# Patient Record
Sex: FEMALE | Race: WHITE | NOT HISPANIC OR LATINO | Employment: FULL TIME | URBAN - METROPOLITAN AREA
[De-identification: names, ages, dates, MRNs, and addresses within clinical notes are randomized per-mention and may not be internally consistent; named-entity substitution may affect disease eponyms.]

---

## 2023-02-28 LAB
APPEARANCE, URINE: ABNORMAL
BACTERIA, URINE: ABNORMAL /HPF
BILIRUBIN, URINE: NEGATIVE
BLOOD, URINE: NEGATIVE
CALCIUM OXALATE CRYSTALS, URINE: ABNORMAL /HPF
COLOR, URINE: ABNORMAL
GLUCOSE, URINE: NEGATIVE MG/DL
KETONES, URINE: ABNORMAL MG/DL
LEUKOCYTE ESTERASE, URINE: ABNORMAL
MUCUS, URINE: ABNORMAL /LPF
NITRITE, URINE: POSITIVE
PH, URINE: 5 (ref 5–8)
PROTEIN, URINE: NEGATIVE MG/DL
RBC, URINE: 4 /HPF (ref 0–5)
SPECIFIC GRAVITY, URINE: 1.03 (ref 1–1.03)
SQUAMOUS EPITHELIAL CELLS, URINE: 8 /HPF
UROBILINOGEN, URINE: <2 MG/DL (ref 0–1.9)
WBC CLUMPS, URINE: ABNORMAL /HPF
WBC, URINE: 8 /HPF (ref 0–5)

## 2023-03-02 LAB — URINE CULTURE: ABNORMAL

## 2023-03-16 LAB
ACTIVATED PARTIAL THROMBOPLASTIN TIME IN PPP BY COAGULATION ASSAY: 30 SEC (ref 26–39)
ALANINE AMINOTRANSFERASE (SGPT) (U/L) IN SER/PLAS: 34 U/L (ref 7–45)
ALBUMIN (G/DL) IN SER/PLAS: 4.2 G/DL (ref 3.4–5)
ALKALINE PHOSPHATASE (U/L) IN SER/PLAS: 93 U/L (ref 33–110)
ANION GAP IN SER/PLAS: 13 MMOL/L (ref 10–20)
ASPARTATE AMINOTRANSFERASE (SGOT) (U/L) IN SER/PLAS: 24 U/L (ref 9–39)
BASOPHILS (10*3/UL) IN BLOOD BY AUTOMATED COUNT: 0.03 X10E9/L (ref 0–0.1)
BASOPHILS/100 LEUKOCYTES IN BLOOD BY AUTOMATED COUNT: 0.4 % (ref 0–2)
BILIRUBIN TOTAL (MG/DL) IN SER/PLAS: 0.2 MG/DL (ref 0–1.2)
CALCIUM (MG/DL) IN SER/PLAS: 8.8 MG/DL (ref 8.6–10.3)
CARBON DIOXIDE, TOTAL (MMOL/L) IN SER/PLAS: 36 MMOL/L (ref 21–32)
CHLORIDE (MMOL/L) IN SER/PLAS: 101 MMOL/L (ref 98–107)
CREATININE (MG/DL) IN SER/PLAS: 0.53 MG/DL (ref 0.5–1.05)
EOSINOPHILS (10*3/UL) IN BLOOD BY AUTOMATED COUNT: 0.14 X10E9/L (ref 0–0.7)
EOSINOPHILS/100 LEUKOCYTES IN BLOOD BY AUTOMATED COUNT: 2.1 % (ref 0–6)
ERYTHROCYTE DISTRIBUTION WIDTH (RATIO) BY AUTOMATED COUNT: 13.5 % (ref 11.5–14.5)
ERYTHROCYTE MEAN CORPUSCULAR HEMOGLOBIN CONCENTRATION (G/DL) BY AUTOMATED: 32.2 G/DL (ref 32–36)
ERYTHROCYTE MEAN CORPUSCULAR VOLUME (FL) BY AUTOMATED COUNT: 89 FL (ref 80–100)
ERYTHROCYTES (10*6/UL) IN BLOOD BY AUTOMATED COUNT: 4.76 X10E12/L (ref 4–5.2)
GFR FEMALE: >90 ML/MIN/1.73M2
GLUCOSE (MG/DL) IN SER/PLAS: 93 MG/DL (ref 74–99)
HEMATOCRIT (%) IN BLOOD BY AUTOMATED COUNT: 42.5 % (ref 36–46)
HEMOGLOBIN (G/DL) IN BLOOD: 13.7 G/DL (ref 12–16)
IMMATURE GRANULOCYTES/100 LEUKOCYTES IN BLOOD BY AUTOMATED COUNT: 0.3 % (ref 0–0.9)
INR IN PPP BY COAGULATION ASSAY: 0.9 (ref 0.9–1.1)
LEUKOCYTES (10*3/UL) IN BLOOD BY AUTOMATED COUNT: 6.8 X10E9/L (ref 4.4–11.3)
LYMPHOCYTES (10*3/UL) IN BLOOD BY AUTOMATED COUNT: 1.34 X10E9/L (ref 1.2–4.8)
LYMPHOCYTES/100 LEUKOCYTES IN BLOOD BY AUTOMATED COUNT: 19.7 % (ref 13–44)
MONOCYTES (10*3/UL) IN BLOOD BY AUTOMATED COUNT: 0.56 X10E9/L (ref 0.1–1)
MONOCYTES/100 LEUKOCYTES IN BLOOD BY AUTOMATED COUNT: 8.2 % (ref 2–10)
NEUTROPHILS (10*3/UL) IN BLOOD BY AUTOMATED COUNT: 4.71 X10E9/L (ref 1.2–7.7)
NEUTROPHILS/100 LEUKOCYTES IN BLOOD BY AUTOMATED COUNT: 69.3 % (ref 40–80)
PLATELETS (10*3/UL) IN BLOOD AUTOMATED COUNT: 461 X10E9/L (ref 150–450)
POTASSIUM (MMOL/L) IN SER/PLAS: 3.6 MMOL/L (ref 3.5–5.3)
PROTEIN TOTAL: 6.4 G/DL (ref 6.4–8.2)
PROTHROMBIN TIME (PT) IN PPP BY COAGULATION ASSAY: 10.3 SEC (ref 9.8–13.4)
SODIUM (MMOL/L) IN SER/PLAS: 146 MMOL/L (ref 136–145)
UREA NITROGEN (MG/DL) IN SER/PLAS: 7 MG/DL (ref 6–23)

## 2023-03-19 LAB — STAPH/MRSA SCREEN, CULTURE: ABNORMAL

## 2023-03-21 DIAGNOSIS — N30.00 ACUTE CYSTITIS WITHOUT HEMATURIA: Primary | ICD-10-CM

## 2023-03-21 RX ORDER — NITROFURANTOIN 25; 75 MG/1; MG/1
100 CAPSULE ORAL 2 TIMES DAILY
Qty: 14 CAPSULE | Refills: 0 | Status: SHIPPED | OUTPATIENT
Start: 2023-03-21 | End: 2023-03-28

## 2023-04-02 LAB — SARS-COV-2 RESULT: NOT DETECTED

## 2023-04-10 ENCOUNTER — PATIENT OUTREACH (OUTPATIENT)
Dept: CARE COORDINATION | Facility: CLINIC | Age: 53
End: 2023-04-10
Payer: COMMERCIAL

## 2023-04-26 ENCOUNTER — PATIENT OUTREACH (OUTPATIENT)
Dept: CARE COORDINATION | Facility: CLINIC | Age: 53
End: 2023-04-26
Payer: COMMERCIAL

## 2023-05-11 LAB
BASOPHILS (10*3/UL) IN BLOOD BY AUTOMATED COUNT: 0.13 X10E9/L (ref 0–0.1)
BASOPHILS/100 LEUKOCYTES IN BLOOD BY AUTOMATED COUNT: 1.2 % (ref 0–2)
C REACTIVE PROTEIN (MG/L) IN SER/PLAS: 11.44 MG/DL
CBC DIFFERENTIAL PATH REVIEW: NORMAL
CREATININE (MG/DL) IN SER/PLAS: 0.38 MG/DL (ref 0.5–1.05)
EOSINOPHILS (10*3/UL) IN BLOOD BY AUTOMATED COUNT: 0.28 X10E9/L (ref 0–0.7)
EOSINOPHILS/100 LEUKOCYTES IN BLOOD BY AUTOMATED COUNT: 2.6 % (ref 0–6)
ERYTHROCYTE DISTRIBUTION WIDTH (RATIO) BY AUTOMATED COUNT: 18 % (ref 11.5–14.5)
ERYTHROCYTE MEAN CORPUSCULAR HEMOGLOBIN CONCENTRATION (G/DL) BY AUTOMATED: 30.4 G/DL (ref 32–36)
ERYTHROCYTE MEAN CORPUSCULAR VOLUME (FL) BY AUTOMATED COUNT: 90 FL (ref 80–100)
ERYTHROCYTES (10*6/UL) IN BLOOD BY AUTOMATED COUNT: 3.16 X10E12/L (ref 4–5.2)
GFR FEMALE: >90 ML/MIN/1.73M2
HEMATOCRIT (%) IN BLOOD BY AUTOMATED COUNT: 28.3 % (ref 36–46)
HEMOGLOBIN (G/DL) IN BLOOD: 8.6 G/DL (ref 12–16)
IMMATURE GRANULOCYTES/100 LEUKOCYTES IN BLOOD BY AUTOMATED COUNT: 4.7 % (ref 0–0.9)
LEUKOCYTES (10*3/UL) IN BLOOD BY AUTOMATED COUNT: 10.8 X10E9/L (ref 4.4–11.3)
LYMPHOCYTES (10*3/UL) IN BLOOD BY AUTOMATED COUNT: 2.32 X10E9/L (ref 1.2–4.8)
LYMPHOCYTES/100 LEUKOCYTES IN BLOOD BY AUTOMATED COUNT: 21.4 % (ref 13–44)
MONOCYTES (10*3/UL) IN BLOOD BY AUTOMATED COUNT: 1.1 X10E9/L (ref 0.1–1)
MONOCYTES/100 LEUKOCYTES IN BLOOD BY AUTOMATED COUNT: 10.2 % (ref 2–10)
NEUTROPHILS (10*3/UL) IN BLOOD BY AUTOMATED COUNT: 6.49 X10E9/L (ref 1.2–7.7)
NEUTROPHILS/100 LEUKOCYTES IN BLOOD BY AUTOMATED COUNT: 59.9 % (ref 40–80)
PLATELETS (10*3/UL) IN BLOOD AUTOMATED COUNT: 1240 X10E9/L (ref 150–450)
RBC MORPHOLOGY IN BLOOD: NORMAL
ROULEAUX PRESENCE IN BLOOD BY LIGHT MICROSCOPY: PRESENT
SEDIMENTATION RATE, ERYTHROCYTE: 55 MM/H (ref 0–30)

## 2023-05-18 LAB
BASOPHILS (10*3/UL) IN BLOOD BY AUTOMATED COUNT: 0.06 X10E9/L (ref 0–0.1)
BASOPHILS/100 LEUKOCYTES IN BLOOD BY AUTOMATED COUNT: 0.6 % (ref 0–2)
C REACTIVE PROTEIN (MG/L) IN SER/PLAS: 6.25 MG/DL
CREATININE (MG/DL) IN SER/PLAS: 0.42 MG/DL (ref 0.5–1.05)
EOSINOPHILS (10*3/UL) IN BLOOD BY AUTOMATED COUNT: 0.86 X10E9/L (ref 0–0.7)
EOSINOPHILS/100 LEUKOCYTES IN BLOOD BY AUTOMATED COUNT: 8 % (ref 0–6)
ERYTHROCYTE DISTRIBUTION WIDTH (RATIO) BY AUTOMATED COUNT: 18.3 % (ref 11.5–14.5)
ERYTHROCYTE MEAN CORPUSCULAR HEMOGLOBIN CONCENTRATION (G/DL) BY AUTOMATED: 30.3 G/DL (ref 32–36)
ERYTHROCYTE MEAN CORPUSCULAR VOLUME (FL) BY AUTOMATED COUNT: 90 FL (ref 80–100)
ERYTHROCYTES (10*6/UL) IN BLOOD BY AUTOMATED COUNT: 3.66 X10E12/L (ref 4–5.2)
GFR FEMALE: >90 ML/MIN/1.73M2
HEMATOCRIT (%) IN BLOOD BY AUTOMATED COUNT: 33 % (ref 36–46)
HEMOGLOBIN (G/DL) IN BLOOD: 10 G/DL (ref 12–16)
IMMATURE GRANULOCYTES/100 LEUKOCYTES IN BLOOD BY AUTOMATED COUNT: 0.8 % (ref 0–0.9)
LEUKOCYTES (10*3/UL) IN BLOOD BY AUTOMATED COUNT: 10.8 X10E9/L (ref 4.4–11.3)
LYMPHOCYTES (10*3/UL) IN BLOOD BY AUTOMATED COUNT: 1.61 X10E9/L (ref 1.2–4.8)
LYMPHOCYTES/100 LEUKOCYTES IN BLOOD BY AUTOMATED COUNT: 14.9 % (ref 13–44)
MONOCYTES (10*3/UL) IN BLOOD BY AUTOMATED COUNT: 0.85 X10E9/L (ref 0.1–1)
MONOCYTES/100 LEUKOCYTES IN BLOOD BY AUTOMATED COUNT: 7.9 % (ref 2–10)
NEUTROPHILS (10*3/UL) IN BLOOD BY AUTOMATED COUNT: 7.32 X10E9/L (ref 1.2–7.7)
NEUTROPHILS/100 LEUKOCYTES IN BLOOD BY AUTOMATED COUNT: 67.8 % (ref 40–80)
PLATELETS (10*3/UL) IN BLOOD AUTOMATED COUNT: 925 X10E9/L (ref 150–450)
SEDIMENTATION RATE, ERYTHROCYTE: >130 MM/H (ref 0–30)

## 2023-05-22 LAB
BASOPHILS (10*3/UL) IN BLOOD BY AUTOMATED COUNT: 0.05 X10E9/L (ref 0–0.1)
BASOPHILS/100 LEUKOCYTES IN BLOOD BY AUTOMATED COUNT: 0.6 % (ref 0–2)
C REACTIVE PROTEIN (MG/L) IN SER/PLAS: 8.89 MG/DL
CREATININE (MG/DL) IN SER/PLAS: 0.34 MG/DL (ref 0.5–1.05)
EOSINOPHILS (10*3/UL) IN BLOOD BY AUTOMATED COUNT: 0.6 X10E9/L (ref 0–0.7)
EOSINOPHILS/100 LEUKOCYTES IN BLOOD BY AUTOMATED COUNT: 7.2 % (ref 0–6)
ERYTHROCYTE DISTRIBUTION WIDTH (RATIO) BY AUTOMATED COUNT: 17.8 % (ref 11.5–14.5)
ERYTHROCYTE MEAN CORPUSCULAR HEMOGLOBIN CONCENTRATION (G/DL) BY AUTOMATED: 30.7 G/DL (ref 32–36)
ERYTHROCYTE MEAN CORPUSCULAR VOLUME (FL) BY AUTOMATED COUNT: 87 FL (ref 80–100)
ERYTHROCYTES (10*6/UL) IN BLOOD BY AUTOMATED COUNT: 3.39 X10E12/L (ref 4–5.2)
GFR FEMALE: >90 ML/MIN/1.73M2
HEMATOCRIT (%) IN BLOOD BY AUTOMATED COUNT: 29.6 % (ref 36–46)
HEMOGLOBIN (G/DL) IN BLOOD: 9.1 G/DL (ref 12–16)
IMMATURE GRANULOCYTES/100 LEUKOCYTES IN BLOOD BY AUTOMATED COUNT: 0.2 % (ref 0–0.9)
LEUKOCYTES (10*3/UL) IN BLOOD BY AUTOMATED COUNT: 8.3 X10E9/L (ref 4.4–11.3)
LYMPHOCYTES (10*3/UL) IN BLOOD BY AUTOMATED COUNT: 1.15 X10E9/L (ref 1.2–4.8)
LYMPHOCYTES/100 LEUKOCYTES IN BLOOD BY AUTOMATED COUNT: 13.9 % (ref 13–44)
MONOCYTES (10*3/UL) IN BLOOD BY AUTOMATED COUNT: 0.65 X10E9/L (ref 0.1–1)
MONOCYTES/100 LEUKOCYTES IN BLOOD BY AUTOMATED COUNT: 7.9 % (ref 2–10)
NEUTROPHILS (10*3/UL) IN BLOOD BY AUTOMATED COUNT: 5.81 X10E9/L (ref 1.2–7.7)
NEUTROPHILS/100 LEUKOCYTES IN BLOOD BY AUTOMATED COUNT: 70.2 % (ref 40–80)
PLATELETS (10*3/UL) IN BLOOD AUTOMATED COUNT: 578 X10E9/L (ref 150–450)
SEDIMENTATION RATE, ERYTHROCYTE: >130 MM/H (ref 0–30)

## 2023-05-25 LAB
BASOPHILS (10*3/UL) IN BLOOD BY AUTOMATED COUNT: 0.09 X10E9/L (ref 0–0.1)
BASOPHILS/100 LEUKOCYTES IN BLOOD BY AUTOMATED COUNT: 1.2 % (ref 0–2)
C REACTIVE PROTEIN (MG/L) IN SER/PLAS: 6.69 MG/DL
CREATININE (MG/DL) IN SER/PLAS: 0.4 MG/DL (ref 0.5–1.05)
EOSINOPHILS (10*3/UL) IN BLOOD BY AUTOMATED COUNT: 0.54 X10E9/L (ref 0–0.7)
EOSINOPHILS/100 LEUKOCYTES IN BLOOD BY AUTOMATED COUNT: 7.1 % (ref 0–6)
ERYTHROCYTE DISTRIBUTION WIDTH (RATIO) BY AUTOMATED COUNT: 17.8 % (ref 11.5–14.5)
ERYTHROCYTE MEAN CORPUSCULAR HEMOGLOBIN CONCENTRATION (G/DL) BY AUTOMATED: 30.4 G/DL (ref 32–36)
ERYTHROCYTE MEAN CORPUSCULAR VOLUME (FL) BY AUTOMATED COUNT: 88 FL (ref 80–100)
ERYTHROCYTES (10*6/UL) IN BLOOD BY AUTOMATED COUNT: 3.53 X10E12/L (ref 4–5.2)
GFR FEMALE: >90 ML/MIN/1.73M2
HEMATOCRIT (%) IN BLOOD BY AUTOMATED COUNT: 30.9 % (ref 36–46)
HEMOGLOBIN (G/DL) IN BLOOD: 9.4 G/DL (ref 12–16)
IMMATURE GRANULOCYTES/100 LEUKOCYTES IN BLOOD BY AUTOMATED COUNT: 0.5 % (ref 0–0.9)
LEUKOCYTES (10*3/UL) IN BLOOD BY AUTOMATED COUNT: 7.6 X10E9/L (ref 4.4–11.3)
LYMPHOCYTES (10*3/UL) IN BLOOD BY AUTOMATED COUNT: 1.17 X10E9/L (ref 1.2–4.8)
LYMPHOCYTES/100 LEUKOCYTES IN BLOOD BY AUTOMATED COUNT: 15.3 % (ref 13–44)
MONOCYTES (10*3/UL) IN BLOOD BY AUTOMATED COUNT: 0.7 X10E9/L (ref 0.1–1)
MONOCYTES/100 LEUKOCYTES IN BLOOD BY AUTOMATED COUNT: 9.2 % (ref 2–10)
NEUTROPHILS (10*3/UL) IN BLOOD BY AUTOMATED COUNT: 5.1 X10E9/L (ref 1.2–7.7)
NEUTROPHILS/100 LEUKOCYTES IN BLOOD BY AUTOMATED COUNT: 66.7 % (ref 40–80)
PLATELETS (10*3/UL) IN BLOOD AUTOMATED COUNT: 548 X10E9/L (ref 150–450)
SEDIMENTATION RATE, ERYTHROCYTE: 60 MM/H (ref 0–30)

## 2023-06-01 LAB
BASOPHILS (10*3/UL) IN BLOOD BY AUTOMATED COUNT: 0.06 X10E9/L (ref 0–0.1)
BASOPHILS/100 LEUKOCYTES IN BLOOD BY AUTOMATED COUNT: 1.1 % (ref 0–2)
C REACTIVE PROTEIN (MG/L) IN SER/PLAS: 8.23 MG/DL
CREATININE (MG/DL) IN SER/PLAS: 0.39 MG/DL (ref 0.5–1.05)
EOSINOPHILS (10*3/UL) IN BLOOD BY AUTOMATED COUNT: 0.94 X10E9/L (ref 0–0.7)
EOSINOPHILS/100 LEUKOCYTES IN BLOOD BY AUTOMATED COUNT: 16.6 % (ref 0–6)
ERYTHROCYTE DISTRIBUTION WIDTH (RATIO) BY AUTOMATED COUNT: 18.3 % (ref 11.5–14.5)
ERYTHROCYTE MEAN CORPUSCULAR HEMOGLOBIN CONCENTRATION (G/DL) BY AUTOMATED: 30.4 G/DL (ref 32–36)
ERYTHROCYTE MEAN CORPUSCULAR VOLUME (FL) BY AUTOMATED COUNT: 89 FL (ref 80–100)
ERYTHROCYTES (10*6/UL) IN BLOOD BY AUTOMATED COUNT: 3.52 X10E12/L (ref 4–5.2)
GFR FEMALE: >90 ML/MIN/1.73M2
HEMATOCRIT (%) IN BLOOD BY AUTOMATED COUNT: 31.3 % (ref 36–46)
HEMOGLOBIN (G/DL) IN BLOOD: 9.5 G/DL (ref 12–16)
IMMATURE GRANULOCYTES/100 LEUKOCYTES IN BLOOD BY AUTOMATED COUNT: 0.4 % (ref 0–0.9)
LEUKOCYTES (10*3/UL) IN BLOOD BY AUTOMATED COUNT: 5.7 X10E9/L (ref 4.4–11.3)
LYMPHOCYTES (10*3/UL) IN BLOOD BY AUTOMATED COUNT: 0.98 X10E9/L (ref 1.2–4.8)
LYMPHOCYTES/100 LEUKOCYTES IN BLOOD BY AUTOMATED COUNT: 17.3 % (ref 13–44)
MONOCYTES (10*3/UL) IN BLOOD BY AUTOMATED COUNT: 0.61 X10E9/L (ref 0.1–1)
MONOCYTES/100 LEUKOCYTES IN BLOOD BY AUTOMATED COUNT: 10.8 % (ref 2–10)
NEUTROPHILS (10*3/UL) IN BLOOD BY AUTOMATED COUNT: 3.06 X10E9/L (ref 1.2–7.7)
NEUTROPHILS/100 LEUKOCYTES IN BLOOD BY AUTOMATED COUNT: 53.8 % (ref 40–80)
PLATELETS (10*3/UL) IN BLOOD AUTOMATED COUNT: 528 X10E9/L (ref 150–450)
SEDIMENTATION RATE, ERYTHROCYTE: >130 MM/H (ref 0–30)

## 2023-06-05 LAB
BASOPHILS (10*3/UL) IN BLOOD BY AUTOMATED COUNT: 0.05 X10E9/L (ref 0–0.1)
BASOPHILS/100 LEUKOCYTES IN BLOOD BY AUTOMATED COUNT: 1 % (ref 0–2)
C REACTIVE PROTEIN (MG/L) IN SER/PLAS: 10.35 MG/DL
CREATININE (MG/DL) IN SER/PLAS: 0.42 MG/DL (ref 0.5–1.05)
EOSINOPHILS (10*3/UL) IN BLOOD BY AUTOMATED COUNT: 0.87 X10E9/L (ref 0–0.7)
EOSINOPHILS/100 LEUKOCYTES IN BLOOD BY AUTOMATED COUNT: 17.6 % (ref 0–6)
ERYTHROCYTE DISTRIBUTION WIDTH (RATIO) BY AUTOMATED COUNT: 18.1 % (ref 11.5–14.5)
ERYTHROCYTE MEAN CORPUSCULAR HEMOGLOBIN CONCENTRATION (G/DL) BY AUTOMATED: 30.7 G/DL (ref 32–36)
ERYTHROCYTE MEAN CORPUSCULAR VOLUME (FL) BY AUTOMATED COUNT: 87 FL (ref 80–100)
ERYTHROCYTES (10*6/UL) IN BLOOD BY AUTOMATED COUNT: 3.78 X10E12/L (ref 4–5.2)
GFR FEMALE: >90 ML/MIN/1.73M2
HEMATOCRIT (%) IN BLOOD BY AUTOMATED COUNT: 32.9 % (ref 36–46)
HEMOGLOBIN (G/DL) IN BLOOD: 10.1 G/DL (ref 12–16)
IMMATURE GRANULOCYTES/100 LEUKOCYTES IN BLOOD BY AUTOMATED COUNT: 0.4 % (ref 0–0.9)
LEUKOCYTES (10*3/UL) IN BLOOD BY AUTOMATED COUNT: 4.9 X10E9/L (ref 4.4–11.3)
LYMPHOCYTES (10*3/UL) IN BLOOD BY AUTOMATED COUNT: 0.76 X10E9/L (ref 1.2–4.8)
LYMPHOCYTES/100 LEUKOCYTES IN BLOOD BY AUTOMATED COUNT: 15.4 % (ref 13–44)
MONOCYTES (10*3/UL) IN BLOOD BY AUTOMATED COUNT: 0.56 X10E9/L (ref 0.1–1)
MONOCYTES/100 LEUKOCYTES IN BLOOD BY AUTOMATED COUNT: 11.4 % (ref 2–10)
NEUTROPHILS (10*3/UL) IN BLOOD BY AUTOMATED COUNT: 2.67 X10E9/L (ref 1.2–7.7)
NEUTROPHILS/100 LEUKOCYTES IN BLOOD BY AUTOMATED COUNT: 54.2 % (ref 40–80)
PLATELETS (10*3/UL) IN BLOOD AUTOMATED COUNT: 633 X10E9/L (ref 150–450)
SEDIMENTATION RATE, ERYTHROCYTE: 100 MM/H (ref 0–30)

## 2023-06-08 ENCOUNTER — TELEMEDICINE (OUTPATIENT)
Dept: PRIMARY CARE | Facility: CLINIC | Age: 53
End: 2023-06-08
Payer: COMMERCIAL

## 2023-06-08 DIAGNOSIS — G62.9 NEUROPATHY: ICD-10-CM

## 2023-06-08 DIAGNOSIS — S32.000G COMPRESSION FRACTURE OF LUMBAR VERTEBRA WITH DELAYED HEALING, UNSPECIFIED LUMBAR VERTEBRAL LEVEL, SUBSEQUENT ENCOUNTER: ICD-10-CM

## 2023-06-08 DIAGNOSIS — S22.080G CLOSED WEDGE COMPRESSION FRACTURE OF T12 VERTEBRA WITH DELAYED HEALING, SUBSEQUENT ENCOUNTER: Primary | ICD-10-CM

## 2023-06-08 PROBLEM — G43.909 MIGRAINES: Status: ACTIVE | Noted: 2023-06-08

## 2023-06-08 PROBLEM — E53.8 VITAMIN B12 DEFICIENCY: Status: ACTIVE | Noted: 2023-06-08

## 2023-06-08 PROBLEM — R11.2 NAUSEA WITH VOMITING: Status: ACTIVE | Noted: 2023-06-08

## 2023-06-08 PROBLEM — D50.0 IRON DEFICIENCY ANEMIA DUE TO CHRONIC BLOOD LOSS: Status: ACTIVE | Noted: 2023-06-08

## 2023-06-08 PROBLEM — S32.000A LUMBAR COMPRESSION FRACTURE (MULTI): Status: ACTIVE | Noted: 2023-06-08

## 2023-06-08 PROBLEM — F43.10 PTSD (POST-TRAUMATIC STRESS DISORDER): Status: ACTIVE | Noted: 2023-06-08

## 2023-06-08 PROBLEM — K58.9 IBS (IRRITABLE BOWEL SYNDROME): Status: ACTIVE | Noted: 2023-06-08

## 2023-06-08 PROBLEM — M54.9 BACK PAIN: Status: ACTIVE | Noted: 2023-06-08

## 2023-06-08 PROBLEM — E55.9 VITAMIN D DEFICIENCY: Status: ACTIVE | Noted: 2023-06-08

## 2023-06-08 PROCEDURE — 99442 PR PHYS/QHP TELEPHONE EVALUATION 11-20 MIN: CPT

## 2023-06-08 RX ORDER — DULOXETIN HYDROCHLORIDE 30 MG/1
1 CAPSULE, DELAYED RELEASE ORAL DAILY
COMMUNITY
Start: 2022-09-14 | End: 2023-10-27 | Stop reason: SDDI

## 2023-06-08 RX ORDER — DOCUSATE SODIUM 100 MG/1
100 CAPSULE, LIQUID FILLED ORAL 2 TIMES DAILY
COMMUNITY
Start: 2020-03-28 | End: 2024-05-13

## 2023-06-08 RX ORDER — CYCLOBENZAPRINE HCL 10 MG
1 TABLET ORAL 3 TIMES DAILY PRN
COMMUNITY
Start: 2022-09-14 | End: 2023-08-22 | Stop reason: SDUPTHER

## 2023-06-08 RX ORDER — OMEPRAZOLE 20 MG/1
1 CAPSULE, DELAYED RELEASE ORAL
COMMUNITY
Start: 2022-09-14

## 2023-06-08 RX ORDER — OXYCODONE HYDROCHLORIDE 5 MG/1
5 TABLET ORAL EVERY 6 HOURS PRN
COMMUNITY
Start: 2023-05-26

## 2023-06-08 RX ORDER — GABAPENTIN 300 MG/1
300 CAPSULE ORAL 3 TIMES DAILY
COMMUNITY
Start: 2022-11-29 | End: 2023-08-22 | Stop reason: SDUPTHER

## 2023-06-08 ASSESSMENT — ENCOUNTER SYMPTOMS
MUSCULOSKELETAL NEGATIVE: 1
CONSTITUTIONAL NEGATIVE: 1
RESPIRATORY NEGATIVE: 1
PSYCHIATRIC NEGATIVE: 1
CARDIOVASCULAR NEGATIVE: 1
ALLERGIC/IMMUNOLOGIC NEGATIVE: 1
GASTROINTESTINAL NEGATIVE: 1
HEMATOLOGIC/LYMPHATIC NEGATIVE: 1
NEUROLOGICAL NEGATIVE: 1
EYES NEGATIVE: 1
ENDOCRINE NEGATIVE: 1

## 2023-06-08 NOTE — PROGRESS NOTES
Subjective   Patient ID: Yarelis Lew is a 53 y.o. female who presents for Follow-up (/).    HPI a 53-year-old female with past medical history of chronic lower back pain, neuropathy, anxiety depression, acid reflux, IBS, wedge compression fracture T12 vertebrae with delayed healing, lumbar compression fracture is here for a telephone audio communication visit.  Over the last couple of months, she underwent extensive lumbar spinal fusion surgery that debridement and revision thoracolumbar fusion.  She was then transported to a local nursing facility for wound care treatment.  She was discharged 2 weeks ago back home where she has home health care for pain management, wound VAC management, and ADLs.  She recently had a follow-up appointment with her orthopedic surgeon on May 23, 2023 with positive results.  She continues to work hard every day to get back to normal.  She is requesting a jury duty dismissal paperwork due to her extensive medical complications.  She reports that she was expected to report for jury duty at the end of the month but due to obvious medical complications and recent surgery, she is unable to attend.  Other than this, she is doing well for the most part    Review of Systems   Constitutional: Negative.    HENT: Negative.     Eyes: Negative.    Respiratory: Negative.     Cardiovascular: Negative.    Gastrointestinal: Negative.    Endocrine: Negative.    Genitourinary: Negative.    Musculoskeletal: Negative.    Skin: Negative.    Allergic/Immunologic: Negative.    Neurological: Negative.    Hematological: Negative.    Psychiatric/Behavioral: Negative.     All other systems reviewed and are negative.      Objective   There were no vitals taken for this visit.    Physical Exam unable to complete due to the nature of this exam.    Assessment/Plan   Problem List Items Addressed This Visit          Nervous    Neuropathy    Relevant Orders    Follow Up In [facility] - PCP        Musculoskeletal    Lumbar compression fracture (CMS/HCC)    Relevant Orders    Follow Up In [facility] - PCP    Wedge compression fracture of T12 vertebra with delayed healing - Primary    Relevant Orders    Follow Up In [facility] - PCP     It was a pleasure speaking with you over the phone today.    Continue all of your medications as prescribed as they are working to control your symptoms.  Continue the treatment plan set forth by your orthopedic provider/surgeon, home health care, and physical therapy.  Work hard to get back on your feet but do not push yourself too much.  I will see you and speak with you again in 3 months.  Please do not hesitate to call office for any future questions or concerns.    I have written and mailed out your jury duty dismissal paperwork.    I also advised you to follow low fat diet and exercise for at least 30 minutes daily.    Anticipatory guidance, age appropriate vaccines, screening exams, health promotion and prevention discussed.    This document was generated using the assistance of voice recognition software. If there are any errors of spelling, grammar, syntax, or meaning; please feel free to contact me directly for clarification.

## 2023-06-08 NOTE — LETTER
Yarelis Lew  1970 6/8/2023    To Whom This May Concern:    My patient, Yarelis Lew, is unable to perform Jury Duty due to a physical condition that renders the prospective juror unfit for jury service for the remainder of the jury year.    Should you have any questions please do not hesitate to call.    Thank you for your cooperation.    Sincerely,    Indra Dyer, NATALIO-CNP

## 2023-06-12 LAB
BASOPHILS (10*3/UL) IN BLOOD BY AUTOMATED COUNT: 0.09 X10E9/L (ref 0–0.1)
BASOPHILS/100 LEUKOCYTES IN BLOOD BY AUTOMATED COUNT: 1.7 % (ref 0–2)
C REACTIVE PROTEIN (MG/L) IN SER/PLAS: 8.3 MG/DL
CREATININE (MG/DL) IN SER/PLAS: 0.38 MG/DL (ref 0.5–1.05)
EOSINOPHILS (10*3/UL) IN BLOOD BY AUTOMATED COUNT: 0.55 X10E9/L (ref 0–0.7)
EOSINOPHILS/100 LEUKOCYTES IN BLOOD BY AUTOMATED COUNT: 10.2 % (ref 0–6)
ERYTHROCYTE DISTRIBUTION WIDTH (RATIO) BY AUTOMATED COUNT: 18.6 % (ref 11.5–14.5)
ERYTHROCYTE MEAN CORPUSCULAR HEMOGLOBIN CONCENTRATION (G/DL) BY AUTOMATED: 30.4 G/DL (ref 32–36)
ERYTHROCYTE MEAN CORPUSCULAR VOLUME (FL) BY AUTOMATED COUNT: 87 FL (ref 80–100)
ERYTHROCYTES (10*6/UL) IN BLOOD BY AUTOMATED COUNT: 3.62 X10E12/L (ref 4–5.2)
GFR FEMALE: >90 ML/MIN/1.73M2
HEMATOCRIT (%) IN BLOOD BY AUTOMATED COUNT: 31.6 % (ref 36–46)
HEMOGLOBIN (G/DL) IN BLOOD: 9.6 G/DL (ref 12–16)
IMMATURE GRANULOCYTES/100 LEUKOCYTES IN BLOOD BY AUTOMATED COUNT: 0.2 % (ref 0–0.9)
LEUKOCYTES (10*3/UL) IN BLOOD BY AUTOMATED COUNT: 5.4 X10E9/L (ref 4.4–11.3)
LYMPHOCYTES (10*3/UL) IN BLOOD BY AUTOMATED COUNT: 1.5 X10E9/L (ref 1.2–4.8)
LYMPHOCYTES/100 LEUKOCYTES IN BLOOD BY AUTOMATED COUNT: 27.8 % (ref 13–44)
MONOCYTES (10*3/UL) IN BLOOD BY AUTOMATED COUNT: 0.79 X10E9/L (ref 0.1–1)
MONOCYTES/100 LEUKOCYTES IN BLOOD BY AUTOMATED COUNT: 14.7 % (ref 2–10)
NEUTROPHILS (10*3/UL) IN BLOOD BY AUTOMATED COUNT: 2.45 X10E9/L (ref 1.2–7.7)
NEUTROPHILS/100 LEUKOCYTES IN BLOOD BY AUTOMATED COUNT: 45.4 % (ref 40–80)
PLATELETS (10*3/UL) IN BLOOD AUTOMATED COUNT: 728 X10E9/L (ref 150–450)
SEDIMENTATION RATE, ERYTHROCYTE: 102 MM/H (ref 0–30)

## 2023-06-19 LAB
BASOPHILS (10*3/UL) IN BLOOD BY AUTOMATED COUNT: 0.1 X10E9/L (ref 0–0.1)
BASOPHILS/100 LEUKOCYTES IN BLOOD BY AUTOMATED COUNT: 1.6 % (ref 0–2)
C REACTIVE PROTEIN (MG/L) IN SER/PLAS: 4.41 MG/DL
CREATININE (MG/DL) IN SER/PLAS: 0.38 MG/DL (ref 0.5–1.05)
EOSINOPHILS (10*3/UL) IN BLOOD BY AUTOMATED COUNT: 0.2 X10E9/L (ref 0–0.7)
EOSINOPHILS/100 LEUKOCYTES IN BLOOD BY AUTOMATED COUNT: 3.2 % (ref 0–6)
ERYTHROCYTE DISTRIBUTION WIDTH (RATIO) BY AUTOMATED COUNT: 17.9 % (ref 11.5–14.5)
ERYTHROCYTE MEAN CORPUSCULAR HEMOGLOBIN CONCENTRATION (G/DL) BY AUTOMATED: 30.1 G/DL (ref 32–36)
ERYTHROCYTE MEAN CORPUSCULAR VOLUME (FL) BY AUTOMATED COUNT: 85 FL (ref 80–100)
ERYTHROCYTES (10*6/UL) IN BLOOD BY AUTOMATED COUNT: 4.18 X10E12/L (ref 4–5.2)
GFR FEMALE: >90 ML/MIN/1.73M2
HEMATOCRIT (%) IN BLOOD BY AUTOMATED COUNT: 35.6 % (ref 36–46)
HEMOGLOBIN (G/DL) IN BLOOD: 10.7 G/DL (ref 12–16)
IMMATURE GRANULOCYTES/100 LEUKOCYTES IN BLOOD BY AUTOMATED COUNT: 0.3 % (ref 0–0.9)
LEUKOCYTES (10*3/UL) IN BLOOD BY AUTOMATED COUNT: 6.3 X10E9/L (ref 4.4–11.3)
LYMPHOCYTES (10*3/UL) IN BLOOD BY AUTOMATED COUNT: 1.87 X10E9/L (ref 1.2–4.8)
LYMPHOCYTES/100 LEUKOCYTES IN BLOOD BY AUTOMATED COUNT: 29.7 % (ref 13–44)
MONOCYTES (10*3/UL) IN BLOOD BY AUTOMATED COUNT: 0.82 X10E9/L (ref 0.1–1)
MONOCYTES/100 LEUKOCYTES IN BLOOD BY AUTOMATED COUNT: 13 % (ref 2–10)
NEUTROPHILS (10*3/UL) IN BLOOD BY AUTOMATED COUNT: 3.29 X10E9/L (ref 1.2–7.7)
NEUTROPHILS/100 LEUKOCYTES IN BLOOD BY AUTOMATED COUNT: 52.2 % (ref 40–80)
PLATELETS (10*3/UL) IN BLOOD AUTOMATED COUNT: 736 X10E9/L (ref 150–450)
SEDIMENTATION RATE, ERYTHROCYTE: 124 MM/H (ref 0–30)
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
## 2023-07-21 ENCOUNTER — DOCUMENTATION (OUTPATIENT)
Dept: CARE COORDINATION | Facility: CLINIC | Age: 53
End: 2023-07-21
Payer: COMMERCIAL

## 2023-08-22 ENCOUNTER — TELEMEDICINE (OUTPATIENT)
Dept: PRIMARY CARE | Facility: CLINIC | Age: 53
End: 2023-08-22
Payer: COMMERCIAL

## 2023-08-22 DIAGNOSIS — G62.9 NEUROPATHY: ICD-10-CM

## 2023-08-22 DIAGNOSIS — S22.080G CLOSED WEDGE COMPRESSION FRACTURE OF T12 VERTEBRA WITH DELAYED HEALING, SUBSEQUENT ENCOUNTER: ICD-10-CM

## 2023-08-22 DIAGNOSIS — S32.000G COMPRESSION FRACTURE OF LUMBAR VERTEBRA WITH DELAYED HEALING, UNSPECIFIED LUMBAR VERTEBRAL LEVEL, SUBSEQUENT ENCOUNTER: ICD-10-CM

## 2023-08-22 PROCEDURE — 99442 PR PHYS/QHP TELEPHONE EVALUATION 11-20 MIN: CPT

## 2023-08-22 RX ORDER — GABAPENTIN 300 MG/1
300 CAPSULE ORAL 2 TIMES DAILY
Qty: 60 CAPSULE | Refills: 11 | Status: SHIPPED | OUTPATIENT
Start: 2023-08-22 | End: 2023-08-22

## 2023-08-22 RX ORDER — CYCLOBENZAPRINE HCL 10 MG
10 TABLET ORAL 3 TIMES DAILY PRN
Qty: 90 TABLET | Refills: 11 | Status: SHIPPED | OUTPATIENT
Start: 2023-08-22 | End: 2023-08-22

## 2023-08-22 ASSESSMENT — ENCOUNTER SYMPTOMS
BACK PAIN: 1
ALLERGIC/IMMUNOLOGIC NEGATIVE: 1
WOUND: 1
CONSTITUTIONAL NEGATIVE: 1
HEMATOLOGIC/LYMPHATIC NEGATIVE: 1
NUMBNESS: 1
ENDOCRINE NEGATIVE: 1
PSYCHIATRIC NEGATIVE: 1
RESPIRATORY NEGATIVE: 1
EYES NEGATIVE: 1
CARDIOVASCULAR NEGATIVE: 1
WEAKNESS: 1
GASTROINTESTINAL NEGATIVE: 1

## 2023-08-22 NOTE — PROGRESS NOTES
Subjective   Patient ID: Yarelis Lew is a 53 y.o. female who presents for No chief complaint on file..    HPI a 53-year-old female with past medical history of chronic lower back pain, neuropathy, anxiety depression, acid reflux, IBS, wedge compression fracture T12 vertebrae with delayed healing, lumbar compression fracture is here for a telephone audio communication visit.  Over the last year, she underwent extensive lumbar spinal fusion surgery that debridement and revision thoracolumbar fusion.  She was then transported to a local nursing facility for wound care treatment.  She was discharged 2 weeks ago back home where she has home health care for pain management, wound VAC management, and ADLs.  She recently had a follow-up appointment with her orthopedic surgeon on May 23, 2023 and July 17th, 2023 with positive results.  She continues to work hard every day to get back to normal.     Review of Systems   Constitutional: Negative.    HENT: Negative.     Eyes: Negative.    Respiratory: Negative.     Cardiovascular: Negative.    Gastrointestinal: Negative.    Endocrine: Negative.    Genitourinary: Negative.    Musculoskeletal:  Positive for back pain and gait problem.   Skin:  Positive for wound.   Allergic/Immunologic: Negative.    Neurological:  Positive for weakness and numbness.   Hematological: Negative.    Psychiatric/Behavioral: Negative.     All other systems reviewed and are negative.      Objective   There were no vitals taken for this visit.    Physical Exam Unable to complete due to the nature of this exam.     Assessment/Plan   Problem List Items Addressed This Visit       Lumbar compression fracture (CMS/HCC)    Relevant Medications    cyclobenzaprine (Flexeril) 10 mg tablet    gabapentin (Neurontin) 300 mg capsule    Wedge compression fracture of T12 vertebra with delayed healing    Relevant Medications    cyclobenzaprine (Flexeril) 10 mg tablet    gabapentin (Neurontin) 300 mg capsule     Neuropathy    Relevant Medications    cyclobenzaprine (Flexeril) 10 mg tablet    gabapentin (Neurontin) 300 mg capsule     It was a pleasure speaking with you in the office today.    Follow up with Ronaldo RICHMOND in 3 months.     Medications sent.     Anticipatory guidance, age appropriate vaccines, screening exams, health promotion and prevention discussed.    This document was generated using the assistance of voice recognition software. If there are any errors of spelling, grammar, syntax, or meaning; please feel free to contact me directly for clarification.

## 2023-09-08 ENCOUNTER — APPOINTMENT (OUTPATIENT)
Dept: PRIMARY CARE | Facility: CLINIC | Age: 53
End: 2023-09-08
Payer: COMMERCIAL

## 2023-09-27 ENCOUNTER — TRANSCRIBE ORDERS (OUTPATIENT)
Dept: ORTHOPEDIC SURGERY | Facility: CLINIC | Age: 53
End: 2023-09-27
Payer: COMMERCIAL

## 2023-09-27 DIAGNOSIS — M54.50 ACUTE BILATERAL LOW BACK PAIN: Primary | ICD-10-CM

## 2023-09-27 DIAGNOSIS — M54.9 POSTOPERATIVE BACK PAIN: ICD-10-CM

## 2023-09-27 DIAGNOSIS — G89.18 POSTOPERATIVE BACK PAIN: ICD-10-CM

## 2023-09-28 LAB
BASOPHILS (10*3/UL) IN BLOOD BY AUTOMATED COUNT: 0.05 X10E9/L (ref 0–0.1)
BASOPHILS/100 LEUKOCYTES IN BLOOD BY AUTOMATED COUNT: 0.7 % (ref 0–2)
C REACTIVE PROTEIN (MG/L) IN SER/PLAS: 0.88 MG/DL
CREATININE (MG/DL) IN SER/PLAS: 0.49 MG/DL (ref 0.5–1.05)
EOSINOPHILS (10*3/UL) IN BLOOD BY AUTOMATED COUNT: 0.21 X10E9/L (ref 0–0.7)
EOSINOPHILS/100 LEUKOCYTES IN BLOOD BY AUTOMATED COUNT: 3.1 % (ref 0–6)
ERYTHROCYTE DISTRIBUTION WIDTH (RATIO) BY AUTOMATED COUNT: 16.9 % (ref 11.5–14.5)
ERYTHROCYTE MEAN CORPUSCULAR HEMOGLOBIN CONCENTRATION (G/DL) BY AUTOMATED: 31 G/DL (ref 32–36)
ERYTHROCYTE MEAN CORPUSCULAR VOLUME (FL) BY AUTOMATED COUNT: 83 FL (ref 80–100)
ERYTHROCYTES (10*6/UL) IN BLOOD BY AUTOMATED COUNT: 4.57 X10E12/L (ref 4–5.2)
GFR FEMALE: >90 ML/MIN/1.73M2
HEMATOCRIT (%) IN BLOOD BY AUTOMATED COUNT: 38.1 % (ref 36–46)
HEMOGLOBIN (G/DL) IN BLOOD: 11.8 G/DL (ref 12–16)
IMMATURE GRANULOCYTES/100 LEUKOCYTES IN BLOOD BY AUTOMATED COUNT: 0.1 % (ref 0–0.9)
LEUKOCYTES (10*3/UL) IN BLOOD BY AUTOMATED COUNT: 6.8 X10E9/L (ref 4.4–11.3)
LYMPHOCYTES (10*3/UL) IN BLOOD BY AUTOMATED COUNT: 1.58 X10E9/L (ref 1.2–4.8)
LYMPHOCYTES/100 LEUKOCYTES IN BLOOD BY AUTOMATED COUNT: 23.4 % (ref 13–44)
MONOCYTES (10*3/UL) IN BLOOD BY AUTOMATED COUNT: 0.64 X10E9/L (ref 0.1–1)
MONOCYTES/100 LEUKOCYTES IN BLOOD BY AUTOMATED COUNT: 9.5 % (ref 2–10)
NEUTROPHILS (10*3/UL) IN BLOOD BY AUTOMATED COUNT: 4.27 X10E9/L (ref 1.2–7.7)
NEUTROPHILS/100 LEUKOCYTES IN BLOOD BY AUTOMATED COUNT: 63.2 % (ref 40–80)
PLATELETS (10*3/UL) IN BLOOD AUTOMATED COUNT: 412 X10E9/L (ref 150–450)

## 2023-10-01 PROBLEM — M62.81 MUSCLE WEAKNESS (GENERALIZED): Status: ACTIVE | Noted: 2023-05-08

## 2023-10-01 PROBLEM — F41.9 ANXIETY DISORDER, UNSPECIFIED: Status: ACTIVE | Noted: 2023-05-03

## 2023-10-01 PROBLEM — M46.20: Status: ACTIVE | Noted: 2023-06-20

## 2023-10-01 PROBLEM — R78.81 MSSA BACTEREMIA: Status: ACTIVE | Noted: 2023-06-20

## 2023-10-01 PROBLEM — M40.295: Status: ACTIVE | Noted: 2023-05-03

## 2023-10-01 PROBLEM — R48.8 OTHER SYMBOLIC DYSFUNCTIONS: Status: ACTIVE | Noted: 2023-05-08

## 2023-10-01 PROBLEM — B95.61 MSSA BACTEREMIA: Status: ACTIVE | Noted: 2023-06-20

## 2023-10-01 PROBLEM — B95.61 METHICILLIN SUSCEPTIBLE STAPHYLOCOCCUS AUREUS INFECTION AS THE CAUSE OF DISEASES CLASSIFIED ELSEWHERE: Status: ACTIVE | Noted: 2023-05-08

## 2023-10-01 PROBLEM — T84.498A: Status: ACTIVE | Noted: 2023-05-03

## 2023-10-01 PROBLEM — K21.9 GASTRO-ESOPHAGEAL REFLUX DISEASE WITHOUT ESOPHAGITIS: Status: ACTIVE | Noted: 2023-05-03

## 2023-10-01 PROBLEM — M43.25 FUSION OF SPINE, THORACOLUMBAR REGION: Status: ACTIVE | Noted: 2023-05-08

## 2023-10-01 PROBLEM — D64.9 ANEMIA, UNSPECIFIED: Status: ACTIVE | Noted: 2023-05-08

## 2023-10-01 RX ORDER — MIRTAZAPINE 45 MG/1
45 TABLET, FILM COATED ORAL NIGHTLY
COMMUNITY
End: 2023-10-27 | Stop reason: SDDI

## 2023-10-01 RX ORDER — DEXTROMETHORPHAN HYDROBROMIDE, GUAIFENESIN 5; 100 MG/5ML; MG/5ML
650 LIQUID ORAL 4 TIMES DAILY PRN
COMMUNITY

## 2023-10-01 RX ORDER — ALPRAZOLAM 0.5 MG/1
0.5 TABLET ORAL 2 TIMES DAILY PRN
COMMUNITY
End: 2023-10-27 | Stop reason: ALTCHOICE

## 2023-10-03 ENCOUNTER — PATIENT OUTREACH (OUTPATIENT)
Dept: CARE COORDINATION | Facility: CLINIC | Age: 53
End: 2023-10-03
Payer: COMMERCIAL

## 2023-10-03 ENCOUNTER — OFFICE VISIT (OUTPATIENT)
Dept: ORTHOPEDIC SURGERY | Facility: CLINIC | Age: 53
End: 2023-10-03
Payer: COMMERCIAL

## 2023-10-03 ENCOUNTER — ANCILLARY PROCEDURE (OUTPATIENT)
Dept: RADIOLOGY | Facility: CLINIC | Age: 53
End: 2023-10-03
Payer: COMMERCIAL

## 2023-10-03 DIAGNOSIS — S32.000D COMPRESSION FRACTURE OF LUMBAR VERTEBRA WITH ROUTINE HEALING, UNSPECIFIED LUMBAR VERTEBRAL LEVEL, SUBSEQUENT ENCOUNTER: ICD-10-CM

## 2023-10-03 DIAGNOSIS — Z98.1 STATUS POST LUMBAR SPINAL FUSION: Primary | ICD-10-CM

## 2023-10-03 DIAGNOSIS — M54.9 POSTOPERATIVE BACK PAIN: ICD-10-CM

## 2023-10-03 DIAGNOSIS — G89.18 POSTOPERATIVE BACK PAIN: ICD-10-CM

## 2023-10-03 PROCEDURE — 99213 OFFICE O/P EST LOW 20 MIN: CPT | Performed by: ORTHOPAEDIC SURGERY

## 2023-10-03 PROCEDURE — 72081 X-RAY EXAM ENTIRE SPI 1 VW: CPT

## 2023-10-03 PROCEDURE — 1036F TOBACCO NON-USER: CPT | Performed by: ORTHOPAEDIC SURGERY

## 2023-10-03 PROCEDURE — 72084 X-RAY EXAM ENTIRE SPI 6/> VW: CPT | Performed by: RADIOLOGY

## 2023-10-03 NOTE — LETTER
October 3, 2023     Indra Dyer, APRN-CNP  [address] [facility] [address] [facility] [address]  [facility] [address]    Patient: Yarelis Lew   YOB: 1970   Date of Visit: 10/3/2023       Dear Dr. Indra Dyer, APRN-CNP:    Thank you for referring Yarelis Lew to me for evaluation. Below are my notes for this consultation.  If you have questions, please do not hesitate to call me. I look forward to following your patient along with you.       Sincerely,     Kishan Schaefer MD      CC: No Recipients  ______________________________________________________________________________________    HPI:Yarelis Lew is a 53-year-old woman, who is now 6 months status post multilevel thoracolumbar osteotomy and thoracolumbar fusion.  Overall, she is improving.  At this time, she feels like she is ready to return to work.  She continues to have axial back pain.  She has no radicular pain.  She has done some physical therapy.      ROS:  Reviewed on EMR and patient intake sheet.    PMH/SH:  Reviewed on EMR and patient intake sheet.    Exam:  Physical Exam    Constitutional: Well appearing; no acute distress  Eyes: pupils are equal and round  Psych: normal affect  Respiratory: non-labored breathing  Cardiovascular: regular rate and rhythm  GI: non-distended abdomen  Musculoskeletal: no pain with range of motion of the shoulders bilaterally; no signs of impingement  Neurologic: [5]/5 strength in the lower extremities bilaterally]; [-] straight leg raise; no clonus; negative babinski    Clinically, the patient stands with beautiful sagittal and coronal alignment.    Radiology:     Standing x-rays demonstrate beautiful sagittal and coronal alignment.  Instrumentation in good position.    Diagnosis:    Status post thoracolumbar fusion    Assessment and Plan:   53-year-old woman 6 months status post thoracolumbar osteotomy and fusion.  Overall, she is doing well given the complexity of her surgical  intervention.  I have released her to return to work at this time with the exception of no heavy lifting greater than 10 pounds and avoiding excessive bending twisting and/or pushing and pulling.  I will see her back at the 1 year dahiana.    The patient was in agreement with the plan. At the end of the visit today, the patient felt that all questions had been answered satisfactorily.  The patient was pleased with the visit and very appreciative for the care rendered.     Thank you very much for the kind referral.  It is a privilege, and a pleasure, to partner with you in the care of your patients.  I would be delighted to assist you with any further consultations as needed.  Please feel free to have your patients refer to my website, [email or web address], for patient education materials regarding treatment options for common spinal conditions.        Kishan Schaefer MD    Chief of Spine Surgery, [facility]  Director of Spine Service, [facility]  , Department of Orthopaedics  [facility] [employer]  [id number] [place] [address] [place] [address]  [email or web address]  P: [phone]    This note was dictated with voice recognition software.  It has not been proofread for grammatical errors, typographical mistakes or other semantic inconsistencies.

## 2023-10-03 NOTE — PROGRESS NOTES
HPI:Yarelis Lew is a 53-year-old woman, who is now 6 months status post multilevel thoracolumbar osteotomy and thoracolumbar fusion.  Overall, she is improving.  At this time, she feels like she is ready to return to work.  She continues to have axial back pain.  She has no radicular pain.  She has done some physical therapy.      ROS:  Reviewed on EMR and patient intake sheet.    PMH/SH:  Reviewed on EMR and patient intake sheet.    Exam:  Physical Exam    Constitutional: Well appearing; no acute distress  Eyes: pupils are equal and round  Psych: normal affect  Respiratory: non-labored breathing  Cardiovascular: regular rate and rhythm  GI: non-distended abdomen  Musculoskeletal: no pain with range of motion of the shoulders bilaterally; no signs of impingement  Neurologic: [5]/5 strength in the lower extremities bilaterally]; [-] straight leg raise; no clonus; negative babinski    Clinically, the patient stands with beautiful sagittal and coronal alignment.    Radiology:     Standing x-rays demonstrate beautiful sagittal and coronal alignment.  Instrumentation in good position.    Diagnosis:    Status post thoracolumbar fusion    Assessment and Plan:   53-year-old woman 6 months status post thoracolumbar osteotomy and fusion.  Overall, she is doing well given the complexity of her surgical intervention.  I have released her to return to work at this time with the exception of no heavy lifting greater than 10 pounds and avoiding excessive bending twisting and/or pushing and pulling.  I will see her back at the 1 year dahiana.    The patient was in agreement with the plan. At the end of the visit today, the patient felt that all questions had been answered satisfactorily.  The patient was pleased with the visit and very appreciative for the care rendered.     Thank you very much for the kind referral.  It is a privilege, and a pleasure, to partner with you in the care of your patients.  I would be delighted to  assist you with any further consultations as needed.  Please feel free to have your patients refer to my website, [email or web address], for patient education materials regarding treatment options for common spinal conditions.        Kishan Schaefer MD    Chief of Spine Surgery, [facility]  Director of Spine Service, [facility]  , Department of Orthopaedics  [facility] [employer]  [address] [place] [address]  [email or web address]  P: [phone]    This note was dictated with voice recognition software.  It has not been proofread for grammatical errors, typographical mistakes or other semantic inconsistencies.

## 2023-10-03 NOTE — PROGRESS NOTES
Patient has been identified to meet criteria for eligibility for the PCCL program. Tried to contact patient to provide information regarding program. Unable to LVM. Sent email to provider: Dr. Kishan Schaefer to provide information to client.

## 2023-10-03 NOTE — LETTER
October 3, 2023     Patient: Yarelis Lew   YOB: 1970   Date of Visit: 10/3/2023       To Whom It May Concern:    It is my medical opinion that Yarelis Lew may return to work on 10/09/2023 with restrictions. No heavy lifting pushing or pulling .    If you have any questions or concerns, please don't hesitate to call.         Sincerely,        Kishan Schaefer MD    CC: No Recipients

## 2023-10-11 ENCOUNTER — PATIENT OUTREACH (OUTPATIENT)
Dept: CARE COORDINATION | Facility: CLINIC | Age: 53
End: 2023-10-11
Payer: COMMERCIAL

## 2023-10-11 NOTE — PROGRESS NOTES
Patient has been identified to meet criteria for eligibility for the PCCL program. Discussed the program with the patient and she is interested. Unable to schedule for assessment and appointment with Dr. De La Rosa. Will follow up on 10/13/2023 for schedule update.

## 2023-10-23 ENCOUNTER — PATIENT OUTREACH (OUTPATIENT)
Dept: CARE COORDINATION | Facility: CLINIC | Age: 53
End: 2023-10-23
Payer: COMMERCIAL

## 2023-10-23 NOTE — PROGRESS NOTES
Patient has been identified to meet criteria for eligibility for the PCCL program.   Spoke with the patient about scheduling for her assessment and follow up with Dr. Rj ferris.   The patient expressed interest while at Dr. Ferris about the program.   Provided call information for scheduling : [phone].   Emailed about financial resources for the [facility]. Will follow up about information.

## 2023-10-26 ENCOUNTER — TELEMEDICINE (OUTPATIENT)
Dept: BEHAVIORAL HEALTH | Facility: CLINIC | Age: 53
End: 2023-10-26
Payer: COMMERCIAL

## 2023-10-26 DIAGNOSIS — F43.10 PTSD (POST-TRAUMATIC STRESS DISORDER): ICD-10-CM

## 2023-10-26 PROCEDURE — 99205 OFFICE O/P NEW HI 60 MIN: CPT | Performed by: STUDENT IN AN ORGANIZED HEALTH CARE EDUCATION/TRAINING PROGRAM

## 2023-10-26 PROCEDURE — 99417 PROLNG OP E/M EACH 15 MIN: CPT | Performed by: STUDENT IN AN ORGANIZED HEALTH CARE EDUCATION/TRAINING PROGRAM
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
## 2023-10-26 NOTE — Clinical Note
PCP: I went ahead and cancelled psych meds; she doesn't take them anyway. Will continue to explore this aversion. I explained to her the Xanax PRN can be addictive and interfere with therapy; she wasn't pleased but was accepting of not restarting a benzo for now.  Annetta: see conceptualization - she has very little prior contact with mental health, never really done therapy, would certainly benefit from the DBT side of ACT. I could also see you partnering with a trauma therapist; we can discuss more next week.

## 2023-10-27 PROBLEM — F43.10 PTSD (POST-TRAUMATIC STRESS DISORDER): Status: ACTIVE | Noted: 2023-05-03

## 2023-10-27 NOTE — PATIENT INSTRUCTIONS
We discussed how PTSD can lead to being hyperaware of possible negative intent in others, as well as higher anger and reactivity. These are all normal mental outcomes of living with trauma, and while they can be useful for survival, they also lead to chronic anxiety, depression, and loneliness.     - start with personalized care team therapist (she will call you to schedule), retraining mental habit patterns by allowing emotions to arise and pass when distressing thoughts arise  - we can formally stop all medications for now, but can revisit them in the future and explore the pros and cons more.
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
## 2023-10-30 ENCOUNTER — PATIENT OUTREACH (OUTPATIENT)
Dept: CARE COORDINATION | Facility: CLINIC | Age: 53
End: 2023-10-30
Payer: COMMERCIAL

## 2023-10-30 NOTE — PROGRESS NOTES
Outreach call to conduct quarterly assessment for Personalized Care for Complex Lives program.  Spoke with patient, completed assessment.  Assessed for social needs, utilities, current mental state, with no needs currently identified.  Provided my contact information ([phone]) for future needs.  Scheduled patient follow up call.

## 2023-10-30 NOTE — PROGRESS NOTES
Outreach call to conduct quarterly assessment for Personalized Care for Complex Lives program.  Spoke with patient, completed assessment.    Assessed for social needs, utilities are currently stable.   Current mental state- Patient states that she feels an increase in her anxiety and depression.   The patient states she doesn't really have a social life and this contributes to her depression and anxiety.   The patient does feel like her mobility and anxiety contribute to not wanting  Resources: Provided food resources and discussed support groups.   Provided my contact information ([phone]) for future needs.   Scheduled patient follow up call.

## 2023-10-31 ENCOUNTER — APPOINTMENT (OUTPATIENT)
Dept: OCCUPATIONAL THERAPY | Facility: HOSPITAL | Age: 53
End: 2023-10-31
Payer: COMMERCIAL

## 2023-11-03 ENCOUNTER — TELEMEDICINE (OUTPATIENT)
Dept: BEHAVIORAL HEALTH | Facility: CLINIC | Age: 53
End: 2023-11-03
Payer: COMMERCIAL

## 2023-11-03 ENCOUNTER — EVALUATION (OUTPATIENT)
Dept: PHYSICAL THERAPY | Facility: HOSPITAL | Age: 53
End: 2023-11-03
Payer: COMMERCIAL

## 2023-11-03 DIAGNOSIS — S32.000D COMPRESSION FRACTURE OF LUMBAR VERTEBRA WITH ROUTINE HEALING, UNSPECIFIED LUMBAR VERTEBRAL LEVEL, SUBSEQUENT ENCOUNTER: ICD-10-CM

## 2023-11-03 DIAGNOSIS — M62.81 MUSCLE WEAKNESS (GENERALIZED): Primary | ICD-10-CM

## 2023-11-03 DIAGNOSIS — F43.10 PTSD (POST-TRAUMATIC STRESS DISORDER): ICD-10-CM

## 2023-11-03 PROCEDURE — 97110 THERAPEUTIC EXERCISES: CPT | Mod: GP | Performed by: PHYSICAL THERAPIST

## 2023-11-03 PROCEDURE — 90837 PSYTX W PT 60 MINUTES: CPT | Performed by: COUNSELOR

## 2023-11-03 PROCEDURE — 97161 PT EVAL LOW COMPLEX 20 MIN: CPT | Mod: GP | Performed by: PHYSICAL THERAPIST

## 2023-11-03 ASSESSMENT — ENCOUNTER SYMPTOMS
LOSS OF SENSATION IN FEET: 1
DEPRESSION: 1
OCCASIONAL FEELINGS OF UNSTEADINESS: 1

## 2023-11-03 ASSESSMENT — PAIN SCALES - GENERAL: PAINLEVEL_OUTOF10: 7

## 2023-11-03 ASSESSMENT — PAIN - FUNCTIONAL ASSESSMENT: PAIN_FUNCTIONAL_ASSESSMENT: 0-10

## 2023-11-03 NOTE — PROGRESS NOTES
Physical Therapy    Physical Therapy Evaluation    Patient Name: Yarelis Lew  MRN: [id number]  Today's Date: 11/3/2023  Time Calculation  Start Time: 0810  Stop Time: 09  Time Calculation (min): 50 min  :1970      Assessment  Pt. Is a 54y/o female with recent thoracolumbar osteotomy with multilevel fusions and extensive hardware placement. Overall pt. Had 3 surgeries d/t septic wound and replacement of hardware. Pt. Is with cont. With pain and difficulty with mobility, dressing, and self care. Pt. Would benefit from skilled PT.  PT Assessment Results: Decreased range of motion, Decreased strength, Pain  Rehab Prognosis: Good  Evaluation/Treatment Tolerance: Patient tolerated treatment well    Plan  Pt. Would benefit from aquatic PT x 4 weeks then progress to land.   Treatment/Interventions: Cryotherapy, Hot pack, Education/ Instruction, Self care/ home management, Therapeutic exercises, Therapeutic activities, Aquatic therapy, Manual therapy  PT Plan: Skilled PT  Rehab Potential: Good  Plan of Care Agreement: Patient    Current Problem  1. Muscle weakness (generalized)        2. Compression fracture of lumbar vertebra with routine healing, unspecified lumbar vertebral level, subsequent encounter  Referral to Physical Therapy    Follow Up In Physical Therapy          Subjective   Pt. With long history of compression fx in lumbar spine and with increased kyphosis. Pt. With lumbar sx x3 in 2023 with extensive hardware placed. Pt. Septic after 1st sx, then revision of hardware. Pt. With 3 weeks in nursing home then home afterwards with home therapy. Pt. Is now not using FWW. She is showering and taking baths with episome salts. Pt. Is with  cont. Difficulty standing, getting out of bed, she is with difficulty dressing, fear of falling, she is being treated for depression that is worsening since her injury.   Pt. Lives alone with no family.   Pt. Has returned to work- unit secretary  [employer]    General:  General  Reason for Referral: intitial eval  Referred By: Dr. Olive MD  Preferred Learning Style: verbal  Precautions:  Precautions  Precautions Comment:  (chest pain; neuropathy; HA; OA; anemia)  Vital Signs:     Pain:  Pain Assessment: 0-10  Pain Score: 7  Pain Type: Chronic pain  Pain Location: Hip (and back)  Home Living:   Pt. Has full access.     Objective   Posture:  Pt. Without kyphotic posture.     Range of Motion:   UE AROM wnl    Lumbar AROM:  Flex: ~30  Ext: ~3  SB: ~5    Strength:  L LE 4+/5  R LE :   Hip : 4/5   Knee and ankle: 4+/5.        Palpation:  Pt. With absent sensation to LT at incision.   (Pt. Reports numbness MAGNO groin).       Gait:   Pt. Amb. Indep slow and antalgic.        Other:  Visit : 1 : 11-3-23 Eval and HEP.    EXERCISES Date  Date  Date Date   VISIT# # # # #    REPS REPS REPS REPS                                                                                                                                                                                                                     HEP                 Outcome Measures:  Modified oswestry: 53     OP EDUCATION:  Access Code: [email or web address]  URL: [email or web address]  Date: 11/03/2023  Prepared by: Misty Mello    Exercises  - Supine Posterior Pelvic Tilt  - 1 x daily - 7 x weekly - 3 sets - 10 reps  - Seated Scapular Retraction  - 1 x daily - 7 x weekly - 3 sets - 10 reps  Education  Individual(s) Educated: Patient  Education Provided: Home Exercise Program, POC  Risk and Benefits Discussed with Patient/Caregiver/Other: yes  Patient/Caregiver Demonstrated Understanding: yes  Plan of Care Discussed and Agreed Upon: yes  Patient Response to Education: Patient/Caregiver Verbalized Understanding of Information    Goals:  Active       muscle weakness; compression fx lumbar vertebrae       Pt. will c/o less than 5/10 back pain with ADL's.        Start:  11/03/23    Expected End:  03/03/24             Pt. will be indep with HEP.        Start:  11/03/23    Expected End:  03/03/24            Pt. will increase MAGNO LE strength to wnl to allow increased up and down stairs.        Start:  11/03/23    Expected End:  03/03/24            Pt. will increase core strength to wnl to allow return to housework wfl.        Start:  11/03/23    Expected End:  03/03/24

## 2023-11-03 NOTE — LETTER
November 3, 2023     Patient: Yarelis Lew   YOB: 1970   Date of Visit: 11/3/2023       To Whom It May Concern:    It is my medical opinion that Yarelis Lew {Work release (duty restriction):53500}.    If you have any questions or concerns, please don't hesitate to call.         Sincerely,        Misty Mello, PT    CC: No Recipients

## 2023-11-03 NOTE — PROGRESS NOTES
All Individuals Present: Patient and Provider (Encounter Provider)     ID: Yarelis Lew is a 53 y.o. female      Pt met with [facility] for follow-up visit for symptoms of anxiety related to PTSD.    An interactive audio and video telecommunication system which permits real time communications between the patient (at the originating site) and the provider (at the distant site) was utilized to provide this telehealth service. Verbal consent was requested and obtained from Yarelis Lew on this date 11/03/23 for a telehealth visit.    Pt is meeting with therapist as a part of the Personalized Care for Complex Lives Program.    Mental Status Exam  General Appearance: Well groomed, appropriate eye contact.  Attitude/Behavior: Cooperative, conversant, engaged, and with good eye contact.  Motor: No psychomotor agitation or retardation, no tremor or other abnormal movements.  Speech: Normal rate, volume, prosody  Gait/Station: Within normal limits  Mood: Normal.  Affect: Euthymic, full-range  Thought Process: Linear, goal directed  Thought Associations: No loosening of associations  Thought Content: normal  Sensorium: Alert and oriented to person, place, time and situation  Insight: Intact, as evidenced by Pt discussion  Judgment: Intact      Risk Assessment:  Imminent Risk of Suicide or Serious Self-Injury: passive suicidal thoughts and does not want to die  Imminent Risk of Violence or Homicide: None, denied    Progress Note:  Pt met with [facility] for initial visit. Pt shared that she is experiencing a significant amount of depression recently. She noted that she is tearful all day most days. She feels like she does not trust people to listen to her. Pt expressed frustration with past medical care and noted that she feels like the physical changes to her body have impacted her sense of self. [facility] asked Pt to think about what she wants her life to look like moving forward. [facility] introduced Pt to the idea of self-compassion to  help her challenge her negative thoughts.    Start Time: 2:02 pm  End Time: 3:00 pm  CPT Code: 99430    Attestation Statements   Number of minutes spent performing psychotherapy: 58

## 2023-11-03 NOTE — LETTER
November 3, 2023     Patient: Yarelis Lew   YOB: 1970   Date of Visit: 11/3/2023       To Whom it May Concern:    Yarelis Lew was seen in my clinic on 11/3/2023. She {Return to school/sport:01752}.    If you have any questions or concerns, please don't hesitate to call.         Sincerely,          Misty Mello, PT        CC: No Recipients

## 2023-11-06 ENCOUNTER — PATIENT OUTREACH (OUTPATIENT)
Dept: CARE COORDINATION | Facility: CLINIC | Age: 53
End: 2023-11-06
Payer: COMMERCIAL

## 2023-11-06 NOTE — PROGRESS NOTES
As a follow up to previous meeting, the  provided the follow resources: Emailed food resources to patient.

## 2023-11-08 ENCOUNTER — TREATMENT (OUTPATIENT)
Dept: PHYSICAL THERAPY | Facility: HOSPITAL | Age: 53
End: 2023-11-08
Payer: COMMERCIAL

## 2023-11-08 DIAGNOSIS — S32.000D COMPRESSION FRACTURE OF LUMBAR VERTEBRA WITH ROUTINE HEALING, UNSPECIFIED LUMBAR VERTEBRAL LEVEL, SUBSEQUENT ENCOUNTER: ICD-10-CM

## 2023-11-08 PROCEDURE — 97113 AQUATIC THERAPY/EXERCISES: CPT | Mod: GP,CQ | Performed by: PHYSICAL THERAPY ASSISTANT

## 2023-11-08 ASSESSMENT — PAIN - FUNCTIONAL ASSESSMENT: PAIN_FUNCTIONAL_ASSESSMENT: 0-10

## 2023-11-08 ASSESSMENT — PAIN SCALES - GENERAL: PAINLEVEL_OUTOF10: 5 - MODERATE PAIN

## 2023-11-08 NOTE — PROGRESS NOTES
Physical Therapy    Physical Therapy Treatment    Patient Name: Yarelis Lew  MRN: [id number]  Today's Date: 11/8/2023  Time Calculation  Start Time: 1430  Stop Time: 1515  Time Calculation (min): 45 min  Visit #2    Assessment:  PT Assessment  PT Assessment Results: Decreased range of motion, Decreased strength, Pain  Rehab Prognosis: Good  Evaluation/Treatment Tolerance: Patient tolerated treatment well  Assessment Comment: pt tolerated treatment without c/o. She is motivated and compliant and will benefit from further skilled treatment. pt with decreased pain this session.    Plan:  OP PT Plan  Treatment/Interventions: Cryotherapy, Hot pack, Education/ Instruction, Self care/ home management, Therapeutic exercises, Therapeutic activities, Aquatic therapy, Manual therapy  PT Plan: Skilled PT  Rehab Potential: Good  Plan of Care Agreement: Patient    Current Problem  1. Compression fracture of lumbar vertebra with routine healing, unspecified lumbar vertebral level, subsequent encounter  Follow Up In Physical Therapy          Subjective pt stated she is feeling a lot better after surgery than prior to surgery.     Pain  Pain Assessment: 0-10  Pain Score: 5 - Moderate pain  Pain Type: Chronic pain  Pain Location: Hip    Objective initiated aquatic therapy this session.           Treatments:  Aquatic Exercise  Aquatic Exercise Performed: Yes  Aquatic Exercise Activity 1: ambulation forward, retro, lateral x 2 laps  Aquatic Exercise Activity 2: marches x 2 laps  Aquatic Exercise Activity 3: HR and TR x15  Aquatic Exercise Activity 4: SLR x15 forward, retro, lateral  Aquatic Exercise Activity 5: Squats x15  Aquatic Exercise Activity 6: stretches Gastroc and hamstring 3x15  Aquatic Exercise Activity 7: distraction x 5 flex       Goals:  Active       muscle weakness; compression fx lumbar vertebrae       Pt. will c/o less than 5/10 back pain with ADL's.        Start:  11/03/23    Expected End:  03/03/24            Pt.  will be indep with HEP.        Start:  11/03/23    Expected End:  03/03/24            Pt. will increase MAGNO LE strength to wnl to allow increased up and down stairs.        Start:  11/03/23    Expected End:  03/03/24            Pt. will increase core strength to wnl to allow return to housework wfl.        Start:  11/03/23    Expected End:  03/03/24

## 2023-11-09 ENCOUNTER — DOCUMENTATION (OUTPATIENT)
Dept: SURGERY | Facility: HOSPITAL | Age: 53
End: 2023-11-09
Payer: COMMERCIAL

## 2023-11-09 ENCOUNTER — DOCUMENTATION (OUTPATIENT)
Dept: PHYSICAL THERAPY | Facility: HOSPITAL | Age: 53
End: 2023-11-09
Payer: COMMERCIAL

## 2023-11-09 NOTE — PROGRESS NOTES
No reasonPhysical Therapy                 Therapy Communication Note    Patient Name: Yarelis Lew  MRN: [id number]  Today's Date: 11/9/2023     Discipline: Physical Therapy    Missed Visit Reason:      Missed Time: No Show    Comment:

## 2023-11-10 ENCOUNTER — TELEMEDICINE (OUTPATIENT)
Dept: BEHAVIORAL HEALTH | Facility: CLINIC | Age: 53
End: 2023-11-10
Payer: COMMERCIAL

## 2023-11-10 DIAGNOSIS — F43.10 PTSD (POST-TRAUMATIC STRESS DISORDER): ICD-10-CM

## 2023-11-10 PROCEDURE — 90837 PSYTX W PT 60 MINUTES: CPT | Performed by: COUNSELOR

## 2023-11-10 RX ORDER — DICLOXACILLIN SODIUM 500 MG/1
CAPSULE ORAL
COMMUNITY
Start: 2023-09-23

## 2023-11-10 NOTE — PROGRESS NOTES
All Individuals Present: Patient and Provider (Encounter Provider)     ID: Yarelis Lew is a 53 y.o. female      Pt met with [facility] for follow-up visit for symptoms of anxiety related to PTSD.    An interactive audio and video telecommunication system which permits real time communications between the patient (at the originating site) and the provider (at the distant site) was utilized to provide this telehealth service. Verbal consent was requested and obtained from Yarelis Lew on this date 11/10/23 for a telehealth visit.    Pt is meeting with therapist as a part of the Personalized Care for Complex Lives Program.    Mental Status Exam  General Appearance: Well groomed, appropriate eye contact.  Attitude/Behavior: Cooperative, conversant, engaged, and with good eye contact.  Motor: No psychomotor agitation or retardation, no tremor or other abnormal movements.  Speech: Normal rate, volume, prosody  Gait/Station: Within normal limits  Mood: Normal.  Affect: Sad/tearful and Congruent with mood and topic of conversation  Thought Process: Linear, goal directed  Thought Associations: No loosening of associations  Thought Content: normal  Sensorium: Alert and oriented to person, place, time and situation  Insight: Intact, as evidenced by Pt discussion  Judgment: Intact      Risk Assessment:  Imminent Risk of Suicide or Serious Self-Injury: None, denied  Imminent Risk of Violence or Homicide: None, denied    Progress Note:  Pt stated that she keeps getting herself overwhelmed. She noted that she is trying to get organized, but she is struggling. Pt shared that she is trying very hard to stay positive in her health journey, but she does get upset at times when she thinks about why this happened to her. Pt discussed some of her thoughts about the relationship cycles she experiences. [facility] encouraged Pt to repeat for herself reminders that she is not responsible for the emotions of others, as she is feeling upset that  her daughters sometimes blame her for their emotions. Pt shared that it was helpful to talk to someone today.    Start Time: 10:01 am  End Time: 10:59 am  CPT Code: [id number]    Attestation Statements   Number of minutes spent performing psychotherapy: 58

## 2023-11-13 ENCOUNTER — TREATMENT (OUTPATIENT)
Dept: PHYSICAL THERAPY | Facility: HOSPITAL | Age: 53
End: 2023-11-13
Payer: COMMERCIAL

## 2023-11-13 DIAGNOSIS — S32.000D COMPRESSION FRACTURE OF LUMBAR VERTEBRA WITH ROUTINE HEALING, UNSPECIFIED LUMBAR VERTEBRAL LEVEL, SUBSEQUENT ENCOUNTER: ICD-10-CM

## 2023-11-13 PROCEDURE — 97113 AQUATIC THERAPY/EXERCISES: CPT | Mod: GP,CQ | Performed by: PHYSICAL THERAPY ASSISTANT

## 2023-11-13 ASSESSMENT — PAIN SCALES - GENERAL: PAINLEVEL_OUTOF10: 6

## 2023-11-13 ASSESSMENT — PAIN - FUNCTIONAL ASSESSMENT: PAIN_FUNCTIONAL_ASSESSMENT: 0-10

## 2023-11-13 NOTE — PROGRESS NOTES
Physical Therapy    Physical Therapy Treatment    Patient Name: Yarelis Lew  MRN: [id number]  Today's Date: 11/13/2023  Time Calculation  Start Time: 1515  Stop Time: 1600  Time Calculation (min): 45 min  VISIT #2    Assessment:  PT Assessment  PT Assessment Results: Decreased range of motion, Decreased strength, Pain  Rehab Prognosis: Good  Evaluation/Treatment Tolerance: Patient tolerated treatment well  Assessment Comment: pt demonstrated improved posture by end of session . She tolerated additions to program without C/O. Yarelis is motivated and compliant with treatment and will benefit from further skilled PT services.    Plan:  OP PT Plan  Treatment/Interventions: Cryotherapy, Hot pack, Education/ Instruction, Self care/ home management, Therapeutic exercises, Therapeutic activities, Aquatic therapy, Manual therapy  PT Plan: Skilled PT (advance pt as able to tolerate without adverse effects.)  Rehab Potential: Good    Current Problem  1. Compression fracture of lumbar vertebra with routine healing, unspecified lumbar vertebral level, subsequent encounter  Follow Up In Physical Therapy          Subjective  Yarelis felt better after ast session. She stated she feels like therapy is going to help. She reported she feels better after surgery, she feels like there is hope.     Pain  Pain Assessment: 0-10  Pain Score: 6  Pain Type: Chronic pain  Pain Location: Hip    Objective initiated deep water bike and step ups this session,           Treatments:  Aquatic Exercise  Aquatic Exercise Performed: Yes  Aquatic Exercise Activity 1: ambulation forward, retro, lateral x 2 laps  Aquatic Exercise Activity 2: marches x 2 laps  Aquatic Exercise Activity 3: HR and TR x15  Aquatic Exercise Activity 4: SLR x15 forward, retro, lateral  Aquatic Exercise Activity 5: Squats x15  Aquatic Exercise Activity 6: stretches Gastroc and hamstring 3x15  Aquatic Exercise Activity 7: distraction x 5 min flex and  3 min noodle bike  Aquatic  Exercise Activity 8: step ups x 10 in  5'           Goals:  Active       muscle weakness; compression fx lumbar vertebrae       Pt. will c/o less than 5/10 back pain with ADL's.        Start:  11/03/23    Expected End:  03/03/24            Pt. will be indep with HEP.        Start:  11/03/23    Expected End:  03/03/24            Pt. will increase MAGNO LE strength to wnl to allow increased up and down stairs.        Start:  11/03/23    Expected End:  03/03/24            Pt. will increase core strength to wnl to allow return to housework wfl.        Start:  11/03/23    Expected End:  03/03/24

## 2023-11-14 ENCOUNTER — TELEMEDICINE (OUTPATIENT)
Dept: BEHAVIORAL HEALTH | Facility: CLINIC | Age: 53
End: 2023-11-14
Payer: COMMERCIAL

## 2023-11-14 DIAGNOSIS — F43.10 PTSD (POST-TRAUMATIC STRESS DISORDER): ICD-10-CM

## 2023-11-14 PROCEDURE — 90834 PSYTX W PT 45 MINUTES: CPT | Performed by: COUNSELOR

## 2023-11-14 NOTE — PROGRESS NOTES
All Individuals Present: Patient and Provider (Encounter Provider)     ID: Yarelis Lew is a 53 y.o. female      Pt met with [facility] for follow-up visit for symptoms of anxiety related to PTSD.    An interactive audio and video telecommunication system which permits real time communications between the patient (at the originating site) and the provider (at the distant site) was utilized to provide this telehealth service. Verbal consent was requested and obtained from Yarelis Lew on this date 11/14/23 for a telehealth visit.    Pt is meeting with therapist as a part of the Personalized Care for Complex Lives Program.    Mental Status Exam  General Appearance: Well groomed, appropriate eye contact.  Attitude/Behavior: Cooperative, conversant, engaged, and with good eye contact.  Motor: No psychomotor agitation or retardation, no tremor or other abnormal movements.  Speech: Normal rate, volume, prosody  Gait/Station: Within normal limits  Mood: Normal.  Affect: Euthymic, full-range  Thought Process: Linear, goal directed  Thought Associations: No loosening of associations  Thought Content: normal  Sensorium: Alert and oriented to person, place, time and situation  Insight: Intact, as evidenced by Pt discussion  Judgment: Intact      Risk Assessment:  Imminent Risk of Suicide or Serious Self-Injury: None, denied  Imminent Risk of Violence or Homicide: None, denied    Progress Note:  Pt shared that she is starting to feel physically improved. She noted that the physical therapy seems to be working and she is noticing nerve connections. Pt stated that she has been trying to stay positive as much as she can, but she does still experience frustration at times. She described using the ride the wave techniques to allow herself to experience her emotions. Pt shared that she is ready to set boundaries for herself to allow her to feel more like who she is, without feeling pressured to be what someone else expects her to  be.     Start Time: 11:00 am  End Time: 11:52 am  CPT Code: 75068    Attestation Statements   Number of minutes spent performing psychotherapy: 52

## 2023-11-15 ENCOUNTER — TREATMENT (OUTPATIENT)
Dept: PHYSICAL THERAPY | Facility: HOSPITAL | Age: 53
End: 2023-11-15
Payer: COMMERCIAL

## 2023-11-15 DIAGNOSIS — S32.000D COMPRESSION FRACTURE OF LUMBAR VERTEBRA WITH ROUTINE HEALING, UNSPECIFIED LUMBAR VERTEBRAL LEVEL, SUBSEQUENT ENCOUNTER: ICD-10-CM

## 2023-11-15 PROCEDURE — 97113 AQUATIC THERAPY/EXERCISES: CPT | Mod: GP,CQ | Performed by: PHYSICAL THERAPY ASSISTANT

## 2023-11-15 ASSESSMENT — PAIN SCALES - GENERAL: PAINLEVEL_OUTOF10: 3

## 2023-11-15 ASSESSMENT — PAIN - FUNCTIONAL ASSESSMENT: PAIN_FUNCTIONAL_ASSESSMENT: 0-10

## 2023-11-15 NOTE — PROGRESS NOTES
Physical Therapy    Physical Therapy Treatment    Patient Name: Yarelis Lew  MRN: [id number]  Today's Date: 11/15/2023  Time Calculation  Start Time: 0200  Stop Time: 0245  Time Calculation (min): 45 min      Assessment:  PT Assessment  PT Assessment Results: Decreased range of motion, Decreased strength, Pain  Rehab Prognosis: Good  Evaluation/Treatment Tolerance: Patient tolerated treatment well  Assessment Comment: pt is demonstrating improved strength and decreased pain. She required cues for good posture and technique, Yarelis coreas    Plan:  OP PT Plan  Treatment/Interventions: Cryotherapy, Hot pack, Education/ Instruction, Self care/ home management, Therapeutic exercises, Therapeutic activities, Aquatic therapy, Manual therapy  PT Plan: Skilled PT  Rehab Potential: Good    Current Problem  1. Compression fracture of lumbar vertebra with routine healing, unspecified lumbar vertebral level, subsequent encounter  Follow Up In Physical Therapy          Subjective Yarelis reported she is feeling much better,     Pain  Pain Assessment: 0-10  Pain Score: 3  Pain Type: Chronic pain  Pain Location:  (between shoulder blades)    Objective increased reps                        Treatments:  Aquatic Exercise  Aquatic Exercise Performed: Yes  Aquatic Exercise Activity 1: ambulation forward, retro, lateral x 2 laps  Aquatic Exercise Activity 2: marches x 2 laps  Aquatic Exercise Activity 3: HR and TR x20  Aquatic Exercise Activity 4: SLR x20 forward, retro, lateral  Aquatic Exercise Activity 5: Squats x20  Aquatic Exercise Activity 6: stretches Gastroc and hamstring 3x15  Aquatic Exercise Activity 7: distraction x 5 min flex and  3 min noodle bike  Aquatic Exercise Activity 8: step ups x 15 in  5'          Goals:  Active       muscle weakness; compression fx lumbar vertebrae       Pt. will c/o less than 5/10 back pain with ADL's.        Start:  11/03/23    Expected End:  03/03/24            Pt. will be indep with HEP.         Start:  11/03/23    Expected End:  03/03/24            Pt. will increase MAGNO LE strength to wnl to allow increased up and down stairs.        Start:  11/03/23    Expected End:  03/03/24            Pt. will increase core strength to wnl to allow return to housework wfl.        Start:  11/03/23    Expected End:  03/03/24

## 2023-11-16 ENCOUNTER — TELEMEDICINE (OUTPATIENT)
Dept: BEHAVIORAL HEALTH | Facility: CLINIC | Age: 53
End: 2023-11-16
Payer: COMMERCIAL

## 2023-11-16 DIAGNOSIS — F43.10 PTSD (POST-TRAUMATIC STRESS DISORDER): ICD-10-CM

## 2023-11-16 DIAGNOSIS — K21.9 GASTRO-ESOPHAGEAL REFLUX DISEASE WITHOUT ESOPHAGITIS: ICD-10-CM

## 2023-11-16 PROCEDURE — 99215 OFFICE O/P EST HI 40 MIN: CPT | Performed by: STUDENT IN AN ORGANIZED HEALTH CARE EDUCATION/TRAINING PROGRAM
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
## 2023-11-16 NOTE — ASSESSMENT & PLAN NOTE
No PCP currently, never followed up with GI following hospitalization for GIB, ongoing ROSARIO, noticing symptoms now that are similar to what she had prior to bleeding duodenal ulcer admission.  - refer to GI

## 2023-11-20 ENCOUNTER — TREATMENT (OUTPATIENT)
Dept: PHYSICAL THERAPY | Facility: HOSPITAL | Age: 53
End: 2023-11-20
Payer: COMMERCIAL

## 2023-11-20 DIAGNOSIS — S32.000D COMPRESSION FRACTURE OF LUMBAR VERTEBRA WITH ROUTINE HEALING, UNSPECIFIED LUMBAR VERTEBRAL LEVEL, SUBSEQUENT ENCOUNTER: ICD-10-CM

## 2023-11-20 PROCEDURE — 97113 AQUATIC THERAPY/EXERCISES: CPT | Mod: GP,CQ | Performed by: PHYSICAL THERAPY ASSISTANT

## 2023-11-20 ASSESSMENT — PAIN - FUNCTIONAL ASSESSMENT: PAIN_FUNCTIONAL_ASSESSMENT: 0-10

## 2023-11-20 ASSESSMENT — PAIN SCALES - GENERAL: PAINLEVEL_OUTOF10: 5 - MODERATE PAIN

## 2023-11-20 NOTE — PROGRESS NOTES
Physical Therapy    Physical Therapy Treatment    Patient Name: Yarelis Lew  MRN: [id number]  Today's Date: 11/20/2023  Time Calculation  Start Time: 0215  Stop Time: 0300  Time Calculation (min): 45 min  Visit #5    Assessment:  PT Assessment  PT Assessment Results: Decreased range of motion, Decreased strength, Pain  Rehab Prognosis: Good  Evaluation/Treatment Tolerance: Patient tolerated treatment well  Assessment Comment: pt tolerated addition of tray exercises without c/o this session. She is making progress toward goals and will benefit from further skilled treatment,  Plan:  OP PT Plan  Treatment/Interventions: Cryotherapy, Hot pack, Education/ Instruction, Self care/ home management, Therapeutic exercises, Therapeutic activities, Aquatic therapy, Manual therapy  PT Plan: Skilled PT (advance program as tolerated by pt.)  Rehab Potential: Good    Current Problem  1. Compression fracture of lumbar vertebra with routine healing, unspecified lumbar vertebral level, subsequent encounter  Follow Up In Physical Therapy                    Subjective  Yarelis reported her pain was qa 5/10 in LB and between shoulders. She stated she painted over the weekend which could have increased her pain.  Precautions  Precautions  Precautions Comment: none       Pain  Pain Assessment  Pain Assessment: 0-10  Pain Score: 5 - Moderate pain  Pain Type: Chronic pain  Pain Location:  (LB and by shoulder blades)    Objective initiated tray exercises this session             Treatments:  Aquatic Exercise  Aquatic Exercise Performed: Yes  Aquatic Exercise Activity 1: ambulation forward, retro, lateral x 2 laps  Aquatic Exercise Activity 2: marches x 2 laps  Aquatic Exercise Activity 3: HR and TR x20  Aquatic Exercise Activity 4: SLR x20 forward, retro, lateral  Aquatic Exercise Activity 5: Squats x20  Aquatic Exercise Activity 6: stretches Gastroc and hamstring 3x15  Aquatic Exercise Activity 7: distraction x 5 min flex and  3 min  noodle bike  Aquatic Exercise Activity 8: step ups x 15 in  5'  Aquatic Exercise Activity 9: tray exercises with T1 push pul and up down x 10         Goals:  Active       muscle weakness; compression fx lumbar vertebrae       Pt. will c/o less than 5/10 back pain with ADL's.        Start:  11/03/23    Expected End:  03/03/24            Pt. will be indep with HEP.        Start:  11/03/23    Expected End:  03/03/24            Pt. will increase MAGNO LE strength to wnl to allow increased up and down stairs.        Start:  11/03/23    Expected End:  03/03/24            Pt. will increase core strength to wnl to allow return to housework wfl.        Start:  11/03/23    Expected End:  03/03/24

## 2023-11-22 ENCOUNTER — TREATMENT (OUTPATIENT)
Dept: PHYSICAL THERAPY | Facility: HOSPITAL | Age: 53
End: 2023-11-22
Payer: COMMERCIAL

## 2023-11-22 DIAGNOSIS — S32.000D COMPRESSION FRACTURE OF LUMBAR VERTEBRA WITH ROUTINE HEALING, UNSPECIFIED LUMBAR VERTEBRAL LEVEL, SUBSEQUENT ENCOUNTER: ICD-10-CM

## 2023-11-22 PROCEDURE — 97113 AQUATIC THERAPY/EXERCISES: CPT | Mod: GP,CQ | Performed by: PHYSICAL THERAPY ASSISTANT

## 2023-11-22 ASSESSMENT — PAIN SCALES - GENERAL: PAINLEVEL_OUTOF10: 5 - MODERATE PAIN

## 2023-11-22 ASSESSMENT — PAIN - FUNCTIONAL ASSESSMENT: PAIN_FUNCTIONAL_ASSESSMENT: 0-10

## 2023-11-22 NOTE — PROGRESS NOTES
Physical Therapy    Physical Therapy Treatment    Patient Name: Yarelis Lew  MRN: [id number]  Today's Date: 11/22/2023  Time Calculation  Start Time: 0330  Stop Time: 0423  Time Calculation (min): 53 min      Assessment:  PT Assessment  PT Assessment Results: Decreased range of motion, Decreased strength, Pain  Rehab Prognosis: Good  Evaluation/Treatment Tolerance: Patient tolerated treatment well  Assessment Comment: pt is making gains toward goals set by PT. She is demonstrating increased strength and episodes of decreased pain.  Plan:      Current Problem  1. Compression fracture of lumbar vertebra with routine healing, unspecified lumbar vertebral level, subsequent encounter  Follow Up In Physical Therapy                    Subjective  pt feels like she is getting better.  Precautions  Precautions  Precautions Comment: none       Pain  Pain Assessment  Pain Assessment: 0-10  Pain Score: 5 - Moderate pain  Pain Type: Chronic pain, Surgical pain  Pain Location:  (LB and between shoulders)    Objective initiated scapular squeeze and shoulder rolls this session.        Treatments:  Aquatic Exercise  Aquatic Exercise Performed: Yes  Aquatic Exercise Activity 1: ambulation forward, retro, lateral x 2 laps  Aquatic Exercise Activity 2: marches x 2 laps  Aquatic Exercise Activity 3: HR and TR x20  Aquatic Exercise Activity 4: SLR x20 forward, retro, lateral  Aquatic Exercise Activity 5: Squats x20  Aquatic Exercise Activity 6: stretches Gastroc and hamstring 3x15  Aquatic Exercise Activity 7: distraction x 5 min flex and  5 min noodle bike  Aquatic Exercise Activity 8: step ups x 15 in  5'  Aquatic Exercise Activity 9: tray exercises with T 1 push pul and up down x 10  Aquatic Exercise Activity 10: shoulder rolls x 10 clockwise and counter clockwise  Aquatic Exercise Activity 11: scapula squeeze 3 x 15 hold         Goals:  Active       muscle weakness; compression fx lumbar vertebrae       Pt. will c/o less than 5/10  back pain with ADL's.        Start:  11/03/23    Expected End:  03/03/24            Pt. will be indep with HEP.        Start:  11/03/23    Expected End:  03/03/24            Pt. will increase MAGNO LE strength to wnl to allow increased up and down stairs.        Start:  11/03/23    Expected End:  03/03/24            Pt. will increase core strength to wnl to allow return to housework wfl.        Start:  11/03/23    Expected End:  03/03/24

## 2023-11-24 ENCOUNTER — APPOINTMENT (OUTPATIENT)
Dept: OCCUPATIONAL THERAPY | Facility: HOSPITAL | Age: 53
End: 2023-11-24
Payer: COMMERCIAL

## 2023-11-27 ENCOUNTER — TREATMENT (OUTPATIENT)
Dept: PHYSICAL THERAPY | Facility: HOSPITAL | Age: 53
End: 2023-11-27
Payer: COMMERCIAL

## 2023-11-27 DIAGNOSIS — S32.000D COMPRESSION FRACTURE OF LUMBAR VERTEBRA WITH ROUTINE HEALING, UNSPECIFIED LUMBAR VERTEBRAL LEVEL, SUBSEQUENT ENCOUNTER: ICD-10-CM

## 2023-11-27 PROCEDURE — 97113 AQUATIC THERAPY/EXERCISES: CPT | Mod: GP,CQ | Performed by: PHYSICAL THERAPY ASSISTANT

## 2023-11-27 ASSESSMENT — PAIN SCALES - GENERAL: PAINLEVEL_OUTOF10: 6

## 2023-11-27 ASSESSMENT — PAIN - FUNCTIONAL ASSESSMENT: PAIN_FUNCTIONAL_ASSESSMENT: 0-10

## 2023-11-27 NOTE — PROGRESS NOTES
Physical Therapy    Physical Therapy    Physical Therapy Treatment    Patient Name: Yarelis Lew  MRN: [id number]  Today's Date: 11/27/2023  Time Calculation  Start Time: 0145  Stop Time: 0230  Time Calculation (min): 45 min  Visit #7    Assessment:  PT Assessment  PT Assessment Results: Decreased range of motion, Decreased strength, Pain  Rehab Prognosis: Good  Evaluation/Treatment Tolerance: Patient tolerated treatment well  Assessment Comment: pt demonstrated increased strength and improved technique with HEP. She is motivated to continue  with POC. She is sleeping better and reported she is pain free at night  Plan:  OP PT Plan  Treatment/Interventions: Cryotherapy, Hot pack, Education/ Instruction, Self care/ home management, Therapeutic exercises, Therapeutic activities, Aquatic therapy, Manual therapy  PT Plan: Skilled PT (Advance pt as tolerated)  Rehab Potential: Good    Current Problem  1. Compression fracture of lumbar vertebra with routine healing, unspecified lumbar vertebral level, subsequent encounter  Follow Up In Physical Therapy                  Subjective   Yarelis reported she is feeling stronger and that she has periods of decreased pain,   Precautions  Precautions  Precautions Comment: none       Pain  Pain Assessment  Pain Assessment: 0-10  Pain Score: 6  Pain Type: Chronic pain, Surgical pain  Pain Location: Back  Pain Orientation: Lower    Objective   Initiated shoulder shrugs this session             Treatments:  Aquatic Exercise  Aquatic Exercise Performed: Yes  Aquatic Exercise Activity 1: ambulation forward, retro, lateral x 2 laps quick  Aquatic Exercise Activity 2: marches x 2 laps  Aquatic Exercise Activity 3: HR and TR x20  Aquatic Exercise Activity 4: SLR x20 forward, retro, lateral  Aquatic Exercise Activity 5: Squats x20  Aquatic Exercise Activity 6: stretches Gastroc and hamstring 3x15  Aquatic Exercise Activity 7: distraction x 5 min flex and  5 min noodle bike  Aquatic  Exercise Activity 8: step ups x 15 in  5'  Aquatic Exercise Activity 9: tray exercises with T 1 push pul and up down x 10  Aquatic Exercise Activity 10: shoulder rolls x 10 clockwise and counter clockwise  Aquatic Exercise Activity 11: scapula squeeze 3 x 15 hold  Aquatic Exercise Activity 12: shoulder shrugs x 10    OP EDUCATION: educated on technique and posture.       Goals:  Active       muscle weakness; compression fx lumbar vertebrae       Pt. will c/o less than 5/10 back pain with ADL's.        Start:  11/03/23    Expected End:  03/03/24            Pt. will be indep with HEP.        Start:  11/03/23    Expected End:  03/03/24            Pt. will increase MAGNO LE strength to wnl to allow increased up and down stairs.        Start:  11/03/23    Expected End:  03/03/24            Pt. will increase core strength to wnl to allow return to housework wfl.        Start:  11/03/23    Expected End:  03/03/24

## 2023-11-28 ENCOUNTER — APPOINTMENT (OUTPATIENT)
Dept: PRIMARY CARE | Facility: CLINIC | Age: 53
End: 2023-11-28
Payer: COMMERCIAL

## 2023-11-29 ENCOUNTER — TREATMENT (OUTPATIENT)
Dept: PHYSICAL THERAPY | Facility: HOSPITAL | Age: 53
End: 2023-11-29
Payer: COMMERCIAL

## 2023-11-29 DIAGNOSIS — S32.000D COMPRESSION FRACTURE OF LUMBAR VERTEBRA WITH ROUTINE HEALING, UNSPECIFIED LUMBAR VERTEBRAL LEVEL, SUBSEQUENT ENCOUNTER: ICD-10-CM

## 2023-11-29 DIAGNOSIS — M62.81 MUSCLE WEAKNESS (GENERALIZED): Primary | ICD-10-CM

## 2023-11-29 PROCEDURE — 97113 AQUATIC THERAPY/EXERCISES: CPT | Mod: GP,CQ

## 2023-11-29 ASSESSMENT — PAIN - FUNCTIONAL ASSESSMENT: PAIN_FUNCTIONAL_ASSESSMENT: 0-10

## 2023-11-29 ASSESSMENT — PAIN DESCRIPTION - DESCRIPTORS: DESCRIPTORS: ACHING;SHARP

## 2023-11-29 ASSESSMENT — PAIN SCALES - GENERAL: PAINLEVEL_OUTOF10: 7

## 2023-11-29 NOTE — PROGRESS NOTES
"Physical Therapy    Physical Therapy Treatment    Patient Name: Yarelis Lew  MRN: [id number]  Today's Date: 11/29/2023  Time Calculation  Start Time: 1545  Stop Time: 1630  Time Calculation (min): 45 min  VISIT 8      Assessment:  PT Assessment  Assessment Comment: good camila of new exs and increases, pain 4/10 after treatment    Plan:  OP PT Plan  PT Plan:  (progress resistance and reps as able)    Current Problem  1. Muscle weakness (generalized)        2. Compression fracture of lumbar vertebra with routine healing, unspecified lumbar vertebral level, subsequent encounter  Follow Up In Physical Therapy          Subjective  pt reports pretty sore after work today was moving in a hurry and caused increased tightness and pain       Precautions  Precautions  Precautions Comment: none     Pain  Pain Assessment: 0-10  Pain Score: 7  Pain Type: Chronic pain  Pain Location: Back  Pain Orientation: Lower, Mid  Pain Descriptors: Aching, Sharp  Pain Frequency: Intermittent    Objective         Treatments:     Aquatic Exercise  Aquatic Exercise Performed: Yes  Aquatic Exercise Activity 1: ambulation forward, retro, lateral x 2 laps quick  Aquatic Exercise Activity 2: marches x 2 laps  Aquatic Exercise Activity 3: HR and TR x20 (on step)  Aquatic Exercise Activity 4: SLR x20 forward, retro, lateral  Aquatic Exercise Activity 5: Squats x20  Aquatic Exercise Activity 6: stretches Gastroc and hamstring 3x15  Aquatic Exercise Activity 7: distratction x 5min flex and  5 min noodle bike  Aquatic Exercise Activity 8: step ups x 15 EA- 3'6\" steps  Aquatic Exercise Activity 9: tray exercises with T1 push pul and up down x15 EA  Aquatic Exercise Activity 10: shpulder rolls x20 clockwise and counter clockwise  Aquatic Exercise Activity 11: scapula squeeze 15 x 3\" hold  Aquatic Exercise Activity 12: shoulder shrugs x20  Aquatic Exercise Activity 13: Wand exs X 3 -Shoulder Flex/Ext, ABD/ADD, Horizontal ABD/ADD 15 EA with open hand    "   Goals:  Active       muscle weakness; compression fx lumbar vertebrae       Pt. will c/o less than 5/10 back pain with ADL's.        Start:  11/03/23    Expected End:  03/03/24            Pt. will be indep with HEP.        Start:  11/03/23    Expected End:  03/03/24            Pt. will increase MAGNO LE strength to wnl to allow increased up and down stairs.        Start:  11/03/23    Expected End:  03/03/24            Pt. will increase core strength to wnl to allow return to housework wfl.        Start:  11/03/23    Expected End:  03/03/24

## 2023-11-30 ENCOUNTER — APPOINTMENT (OUTPATIENT)
Dept: BEHAVIORAL HEALTH | Facility: CLINIC | Age: 53
End: 2023-11-30
Payer: COMMERCIAL

## 2023-12-01 ENCOUNTER — PATIENT OUTREACH (OUTPATIENT)
Dept: CARE COORDINATION | Facility: CLINIC | Age: 53
End: 2023-12-01
Payer: COMMERCIAL

## 2023-12-01 NOTE — PROGRESS NOTES
The  reached out to the patient regarding [facility] wellness check follow up. Provided contact information and left voicemail.

## 2023-12-04 ENCOUNTER — TREATMENT (OUTPATIENT)
Dept: PHYSICAL THERAPY | Facility: HOSPITAL | Age: 53
End: 2023-12-04
Payer: COMMERCIAL

## 2023-12-04 DIAGNOSIS — S32.000D COMPRESSION FRACTURE OF LUMBAR VERTEBRA WITH ROUTINE HEALING, UNSPECIFIED LUMBAR VERTEBRAL LEVEL, SUBSEQUENT ENCOUNTER: ICD-10-CM

## 2023-12-04 DIAGNOSIS — M62.81 MUSCLE WEAKNESS (GENERALIZED): Primary | ICD-10-CM

## 2023-12-04 PROCEDURE — 97110 THERAPEUTIC EXERCISES: CPT | Mod: GP | Performed by: PHYSICAL THERAPIST

## 2023-12-04 PROCEDURE — 97113 AQUATIC THERAPY/EXERCISES: CPT | Mod: GP,CQ | Performed by: PHYSICAL THERAPY ASSISTANT

## 2023-12-04 ASSESSMENT — PAIN SCALES - GENERAL
PAINLEVEL_OUTOF10: 4
PAINLEVEL_OUTOF10: 4

## 2023-12-04 ASSESSMENT — PAIN DESCRIPTION - DESCRIPTORS: DESCRIPTORS: ACHING;SHARP

## 2023-12-04 ASSESSMENT — PAIN - FUNCTIONAL ASSESSMENT
PAIN_FUNCTIONAL_ASSESSMENT: 0-10
PAIN_FUNCTIONAL_ASSESSMENT: 0-10

## 2023-12-04 NOTE — PROGRESS NOTES
"Physical Therapy    Physical Therapy Treatment - recheck    Patient Name: Yarelis Lew  MRN: [id number]  Today's Date: 12/4/2023  Time Calculation  Start Time: 1445  Stop Time: 1515  Time Calculation (min): 30 min1969  Visit: #9      Assessment:  Pt. Is progressing with strength and posture and decreasing pain.          Plan:  One day land, one day pool 2 x week x 4 weeks.   Progress land therapy to increase hip strength, postural mm. Strength, and hip and low back flexibility.      Current Problem  1. Muscle weakness (generalized)  Follow Up In Physical Therapy      2. Compression fracture of lumbar vertebra with routine healing, unspecified lumbar vertebral level, subsequent encounter  Follow Up In Physical Therapy    Follow Up In Physical Therapy          Subjective   Pt. With difficulty getting her shoes and socks on. Or getting back up from a squatting position.         Pain  Pain Assessment: 0-10  Pain Score: 4  Pain Type: Chronic pain  Pain Location: Back  Pain Orientation: Right    Objective     Posture:  Pt. Without kyphotic posture.     Range of Motion:   UE AROM wnl    Lumbar AROM:  Flex: ~45  Ext: ~3  SB: ~10    Strength:  L LE 4+-5-/5  R LE :   Hip : 4+/5   Knee and ankle: 5-/5.     MAGNO UE:   4+/5       Palpation:  Pt. With absent sensation to LT at incision.   (Pt. Reports numbness MAGNO groin).       Gait:   Pt. Amb. Indep slow and antalgic.        Treatments:   Visit : 1 : 11-3-23 Eval and HEP.    EXERCISES Date 12-4 Date  Date Date   VISIT# #9 # # #    REPS REPS REPS REPS           recheck      nustep              Shuttle dlp                   slp              SB: DKTC             LRT              PPT with hip add       PPT with hip abd              Tband        row       pulldown       er       Bicep curl       Tricep press              Mini squats              bridges                                                        HEP               OP EDUCATION:  Modified piriformis stretch. 30\"x3 "   Continued upright posture  Pelvic tilt cont.      Goals:  Active       muscle weakness; compression fx lumbar vertebrae       Pt. will c/o less than 5/10 back pain with ADL's.        Start:  11/03/23    Expected End:  03/03/24            Pt. will be indep with HEP.        Start:  11/03/23    Expected End:  03/03/24            Pt. will increase MAGNO LE strength to wnl to allow increased up and down stairs.        Start:  11/03/23    Expected End:  03/03/24            Pt. will increase core strength to wnl to allow return to housework wfl.        Start:  11/03/23    Expected End:  03/03/24

## 2023-12-04 NOTE — PROGRESS NOTES
Physical Therapy    Physical Therapy Treatment    Patient Name: Yarelis Lew  MRN: [id number]  Today's Date: 12/4/2023  Time Calculation  Start Time: 0330  Stop Time: 0415  Time Calculation (min): 45 min  Visit# 9    Assessment:  PT Assessment  PT Assessment Results: Decreased range of motion, Decreased strength, Pain  Rehab Prognosis: Good  Evaluation/Treatment Tolerance: Patient tolerated treatment well  Assessment Comment: pt is demonstrating progress toward all goals set by PT.. her posture and strength are improving.and her pain is decreasing.  Plan:  OP PT Plan  Treatment/Interventions: Cryotherapy, Hot pack, Education/ Instruction, Self care/ home management, Therapeutic exercises, Therapeutic activities, Aquatic therapy, Manual therapy  PT Plan: Skilled PT  Rehab Potential: Good    Current Problem  1. Compression fracture of lumbar vertebra with routine healing, unspecified lumbar vertebral level, subsequent encounter  Follow Up In Physical Therapy                    Subjective   Yarelis feels like she is making progress. She is glad to be doing land and pool therapy after reassessment this date.  Precautions  Precautions  Precautions Comment: none       Pain  Pain Assessment  Pain Assessment: 0-10  Pain Score: 4  Pain Type: Chronic pain  Pain Location: Back  Pain Orientation: Right  Pain Descriptors: Aching, Sharp  Pain Frequency: Intermittent    Objective   Increased reps on step ups/       Treatments:  Aquatic Exercise  Aquatic Exercise Performed: Yes  Aquatic Exercise Activity 1: ambulation forward, retro, lateral x 2 laps quick with arm swing  Aquatic Exercise Activity 2: marches x 2 laps  Aquatic Exercise Activity 3: HR and TR x20  Aquatic Exercise Activity 4: SLR x20 forward, retro, lateral  Aquatic Exercise Activity 5: Squats x20  Aquatic Exercise Activity 6: stretches Gastroc and hamstring 3x15  Aquatic Exercise Activity 7: distraction x 5min flex and  5 min noodle bike  Aquatic Exercise Activity  "8: step ups x 15 EA- 3'6\" steps  Aquatic Exercise Activity 9: tray exercises with T1 push pul and up down x20 EA  Aquatic Exercise Activity 10: shoulder rolls x20 clockwise and counter clockwise  Aquatic Exercise Activity 11: scapula squeeze 15 x 3\" hold  Aquatic Exercise Activity 12: shoulder shrugs x20  Aquatic Exercise Activity 13: Wand ex's X 3 -Shoulder Flex/Ext, ABD/ADD, Horizontal ABD/ADD 15 EA with open hand           Goals:  Active       muscle weakness; compression fx lumbar vertebrae       Pt. will c/o less than 5/10 back pain with ADL's.        Start:  11/03/23    Expected End:  03/03/24            Pt. will be indep with HEP.        Start:  11/03/23    Expected End:  03/03/24            Pt. will increase MAGNO LE strength to wnl to allow increased up and down stairs.        Start:  11/03/23    Expected End:  03/03/24            Pt. will increase core strength to wnl to allow return to housework wfl.        Start:  11/03/23    Expected End:  03/03/24              "

## 2023-12-06 PROCEDURE — RXMED WILLOW AMBULATORY MEDICATION CHARGE

## 2023-12-07 ENCOUNTER — PHARMACY VISIT (OUTPATIENT)
Dept: PHARMACY | Facility: CLINIC | Age: 53
End: 2023-12-07
Payer: COMMERCIAL

## 2023-12-08 ENCOUNTER — TELEPHONE (OUTPATIENT)
Dept: BEHAVIORAL HEALTH | Facility: CLINIC | Age: 53
End: 2023-12-08
Payer: COMMERCIAL

## 2023-12-08 NOTE — PROGRESS NOTES
[facility] called Pt to check in. Call went to voicemail and [facility] left message with a callback number of [phone].

## 2023-12-11 ENCOUNTER — TREATMENT (OUTPATIENT)
Dept: PHYSICAL THERAPY | Facility: HOSPITAL | Age: 53
End: 2023-12-11
Payer: COMMERCIAL

## 2023-12-11 DIAGNOSIS — S32.000D COMPRESSION FRACTURE OF LUMBAR VERTEBRA WITH ROUTINE HEALING, UNSPECIFIED LUMBAR VERTEBRAL LEVEL, SUBSEQUENT ENCOUNTER: ICD-10-CM

## 2023-12-11 PROCEDURE — 97113 AQUATIC THERAPY/EXERCISES: CPT | Mod: GP,CQ | Performed by: PHYSICAL THERAPY ASSISTANT

## 2023-12-11 ASSESSMENT — PAIN DESCRIPTION - DESCRIPTORS: DESCRIPTORS: ACHING

## 2023-12-11 ASSESSMENT — PAIN SCALES - GENERAL: PAINLEVEL_OUTOF10: 5 - MODERATE PAIN

## 2023-12-11 ASSESSMENT — PAIN - FUNCTIONAL ASSESSMENT: PAIN_FUNCTIONAL_ASSESSMENT: 0-10

## 2023-12-11 NOTE — PROGRESS NOTES
Physical Therapy    Physical Therapy Treatment    Patient Name: Yarelis Lew  MRN: [id number]  Today's Date: 12/11/2023  Time Calculation  Start Time: 1600  Stop Time: 1645  Time Calculation (min): 45 min  Visit #10    Assessment:  PT Assessment  PT Assessment Results: Decreased range of motion, Decreased strength, Pain  Rehab Prognosis: Good  Evaluation/Treatment Tolerance: Patient tolerated treatment well  Assessment Comment: pt is making good progress toward goals set by PT and will benefit from further skilled PT. She is demonstrating increased strength and decreased pain by end of session. pt is pleased with her progress.  Plan:  OP PT Plan  Treatment/Interventions: Cryotherapy, Hot pack, Education/ Instruction, Self care/ home management, Therapeutic exercises, Therapeutic activities, Aquatic therapy, Manual therapy  PT Plan: Skilled PT  Rehab Potential: Good  Plan of Care Agreement: Patient    Current Problem  1. Compression fracture of lumbar vertebra with routine healing, unspecified lumbar vertebral level, subsequent encounter  Follow Up In Physical Therapy                    Subjective  Yarelis feels like therapy is making her stronger and increasing ROM. She is able to put her socks on now. Her pain is a 5/10 prior to treatment and decreased by end of session.   Precautions  Precautions  Precautions Comment: none       Pain  Pain Assessment  Pain Assessment: 0-10  Pain Score: 5 - Moderate pain  Pain Type: Chronic pain  Pain Location: Back  Pain Orientation:  (center/tailbone)  Pain Descriptors: Aching    Objective          Treatments:  Aquatic Exercise  Aquatic Exercise Performed: Yes  Aquatic Exercise Activity 1: ambulation forward, retro, lateral x 2 laps quick with arm swing. With dumb bells forw/lateral.  Aquatic Exercise Activity 2: marches x 2 laps  Aquatic Exercise Activity 3: HR and TR x20  Aquatic Exercise Activity 4: SLR x20 forward, retro, lateral  Aquatic Exercise Activity 5: Squats  "x20  Aquatic Exercise Activity 6: stretches Gastroc and hamstring 3x15  Aquatic Exercise Activity 7: distraction x 5 min flex and  5 min noodle bike  Aquatic Exercise Activity 8: step ups x 15 EA- 3'6\" steps  Aquatic Exercise Activity 9: tray exercises with T 1 push pul and up down x20 EA  Aquatic Exercise Activity 10: shoulder rolls x20 clockwise and counter clockwise  Aquatic Exercise Activity 11: scapula squeeze 15 x 3\" hold  Aquatic Exercise Activity 12: shoulder shrugs x20  Aquatic Exercise Activity 13: Wand ex's X 3 -Shoulder Flex/Ext, ABD/ADD, Horizontal ABD/ADD 15 EA with open hand           Goals:  Active       muscle weakness; compression fx lumbar vertebrae       Pt. will c/o less than 5/10 back pain with ADL's.        Start:  11/03/23    Expected End:  03/03/24            Pt. will be indep with HEP.        Start:  11/03/23    Expected End:  03/03/24            Pt. will increase MAGNO LE strength to wnl to allow increased up and down stairs.        Start:  11/03/23    Expected End:  03/03/24            Pt. will increase core strength to wnl to allow return to housework wfl.        Start:  11/03/23    Expected End:  03/03/24              "

## 2023-12-18 ENCOUNTER — TREATMENT (OUTPATIENT)
Dept: PHYSICAL THERAPY | Facility: HOSPITAL | Age: 53
End: 2023-12-18
Payer: COMMERCIAL

## 2023-12-18 DIAGNOSIS — S32.000D COMPRESSION FRACTURE OF LUMBAR VERTEBRA WITH ROUTINE HEALING, UNSPECIFIED LUMBAR VERTEBRAL LEVEL, SUBSEQUENT ENCOUNTER: ICD-10-CM

## 2023-12-18 DIAGNOSIS — M40.295 OTHER KYPHOSIS, THORACOLUMBAR REGION: ICD-10-CM

## 2023-12-18 DIAGNOSIS — M62.81 MUSCLE WEAKNESS (GENERALIZED): Primary | ICD-10-CM

## 2023-12-18 PROCEDURE — 97110 THERAPEUTIC EXERCISES: CPT | Mod: GP | Performed by: PHYSICAL THERAPIST

## 2023-12-18 ASSESSMENT — PAIN - FUNCTIONAL ASSESSMENT: PAIN_FUNCTIONAL_ASSESSMENT: 0-10

## 2023-12-18 ASSESSMENT — PAIN SCALES - GENERAL: PAINLEVEL_OUTOF10: 7

## 2023-12-18 NOTE — PROGRESS NOTES
"Physical Therapy    Physical Therapy Treatment    Patient Name: Yarelis Lew  MRN: [id number]  Today's Date: 12/18/2023  Time Calculation  Start Time: 1350  Stop Time: 1440  Time Calculation (min): 50 min1969  Visit: #11       Assessment:  Pt. With decreased awareness of PPT motion happening even though she was performing this well. She wonders if it is d/t numbness in this area.          Plan:  Cont progress postural strength.      Current Problem  1. Muscle weakness (generalized)        2. Compression fracture of lumbar vertebra with routine healing, unspecified lumbar vertebral level, subsequent encounter  Follow Up In Physical Therapy      3. Other kyphosis, thoracolumbar region            Subjective   Pt. States she has been lying on the floor at home to work on her posture. She states sometimes her back with \"pop\", and it feels relieving.         Pain  Pain Assessment: 0-10  Pain Score: 7  Pain Type: Chronic pain  Pain Location: Back    Objective   Initiated increase land therEx.  Treatments:   Visit : 1 : 11-3-23 Eval and HEP.    EXERCISES Date 12-4 Date  Date Date   VISIT# #9 #10 # #    REPS REPS REPS REPS           recheck      nustep  L3 10'            Shuttle dlp  4b 10x2                 slp  3b 10x2 ea            SB: DKTC  Gr 10x2           LRT  Gr 10x2            PPT with hip add  10x2     PPT with hip abd  Yel. 10x2            Tband        row       pulldown       er       Bicep curl       Tricep press              Mini squats              bridges                                                        HEP                 OP EDUCATION:  Pt. Educated on fusion and rods and to be cautious with any popping in her back.      Goals:  Active       muscle weakness; compression fx lumbar vertebrae       Pt. will c/o less than 5/10 back pain with ADL's.        Start:  11/03/23    Expected End:  03/03/24            Pt. will be indep with HEP.        Start:  11/03/23    Expected End:  03/03/24            Pt. " will increase MAGNO LE strength to wnl to allow increased up and down stairs.        Start:  11/03/23    Expected End:  03/03/24            Pt. will increase core strength to wnl to allow return to housework wfl.        Start:  11/03/23    Expected End:  03/03/24

## 2023-12-20 ENCOUNTER — DOCUMENTATION (OUTPATIENT)
Dept: PHYSICAL THERAPY | Facility: HOSPITAL | Age: 53
End: 2023-12-20
Payer: COMMERCIAL

## 2023-12-20 NOTE — PROGRESS NOTES
Physical Therapy                 Therapy Communication Note    Patient Name: Yarelis Lew  MRN: [id number]  Today's Date: 12/20/2023     Discipline: Physical Therapy    Missed Visit Reason:      Missed Time: No Show    Comment:

## 2023-12-26 ENCOUNTER — TREATMENT (OUTPATIENT)
Dept: PHYSICAL THERAPY | Facility: HOSPITAL | Age: 53
End: 2023-12-26
Payer: COMMERCIAL

## 2023-12-26 DIAGNOSIS — S32.000D COMPRESSION FRACTURE OF LUMBAR VERTEBRA WITH ROUTINE HEALING, UNSPECIFIED LUMBAR VERTEBRAL LEVEL, SUBSEQUENT ENCOUNTER: ICD-10-CM

## 2023-12-26 DIAGNOSIS — M62.81 MUSCLE WEAKNESS (GENERALIZED): Primary | ICD-10-CM

## 2023-12-26 PROCEDURE — 97113 AQUATIC THERAPY/EXERCISES: CPT | Mod: GP,CQ

## 2023-12-26 ASSESSMENT — PAIN - FUNCTIONAL ASSESSMENT: PAIN_FUNCTIONAL_ASSESSMENT: 0-10

## 2023-12-26 ASSESSMENT — PAIN SCALES - GENERAL: PAINLEVEL_OUTOF10: 4

## 2023-12-26 ASSESSMENT — PAIN DESCRIPTION - DESCRIPTORS: DESCRIPTORS: ACHING

## 2023-12-26 NOTE — PROGRESS NOTES
"Physical Therapy    Physical Therapy Treatment    Patient Name: Yarelis Lew  MRN: [id number]  Today's Date: 12/26/2023  Time Calculation  Start Time: 1440  Stop Time: 1525  Time Calculation (min): 45 min  VISIT 11      Assessment:  PT Assessment  Assessment Comment: good camila of all increases, pain 2/10 after all exs    Plan:  OP PT Plan  PT Plan:  (progress reps as camila)    Current Problem  1. Muscle weakness (generalized)        2. Compression fracture of lumbar vertebra with routine healing, unspecified lumbar vertebral level, subsequent encounter  Follow Up In Physical Therapy          Subjective  pt reports feeling just achy today had to work today as well.  Pt reports missed last appt had to help daughter        Precautions  Precautions  Precautions Comment: none     Pain  Pain Assessment: 0-10  Pain Score: 4  Pain Type: Chronic pain  Pain Location: Back  Pain Descriptors: Aching  Pain Frequency: Constant/continuous    Objective increased reps added BDB to wand exs        Treatments:     Aquatic Exercise  Aquatic Exercise Performed: Yes  Aquatic Exercise Activity 1: ambulation forward, retro, lateral x 2 laps quick with arm swing. With dumb bells forw/lateral.  Aquatic Exercise Activity 2: marches x 2 laps (added BDB)  Aquatic Exercise Activity 3: HR and TR x25  Aquatic Exercise Activity 4: SLR x25 forward, retro, lateral  Aquatic Exercise Activity 5: Squats x25  Aquatic Exercise Activity 6: stretches Gastroc and hamstring 3x15  Aquatic Exercise Activity 7: distratction x 5min flex and  5 min noodle bike  Aquatic Exercise Activity 8: step ups x 20 EA- 3'6\" steps  Aquatic Exercise Activity 9: tray exercises with T1 push pul and up down x25 EA  Aquatic Exercise Activity 10: shoulder rolls x20 clockwise and counter clockwise  Aquatic Exercise Activity 11: scapula squeeze 15 x 3\" hold  Aquatic Exercise Activity 12: shoulder shrugs x20  Aquatic Exercise Activity 13: Wand exs X 3 -Shoulder Flex/Ext, ABD/ADD, " Horizontal ABD/ADD 15 EA with open hand (added BDB)      Goals:  Active       muscle weakness; compression fx lumbar vertebrae       Pt. will c/o less than 5/10 back pain with ADL's.        Start:  11/03/23    Expected End:  03/03/24            Pt. will be indep with HEP.        Start:  11/03/23    Expected End:  03/03/24            Pt. will increase MAGNO LE strength to wnl to allow increased up and down stairs.        Start:  11/03/23    Expected End:  03/03/24            Pt. will increase core strength to wnl to allow return to housework wfl.        Start:  11/03/23    Expected End:  03/03/24

## 2023-12-28 ENCOUNTER — TREATMENT (OUTPATIENT)
Dept: PHYSICAL THERAPY | Facility: HOSPITAL | Age: 53
End: 2023-12-28
Payer: COMMERCIAL

## 2023-12-28 DIAGNOSIS — S32.000D COMPRESSION FRACTURE OF LUMBAR VERTEBRA WITH ROUTINE HEALING, UNSPECIFIED LUMBAR VERTEBRAL LEVEL, SUBSEQUENT ENCOUNTER: ICD-10-CM

## 2023-12-28 PROCEDURE — 97110 THERAPEUTIC EXERCISES: CPT | Mod: GP | Performed by: PHYSICAL THERAPIST

## 2023-12-28 ASSESSMENT — PAIN SCALES - GENERAL: PAINLEVEL_OUTOF10: 4

## 2023-12-28 ASSESSMENT — PAIN DESCRIPTION - DESCRIPTORS: DESCRIPTORS: ACHING

## 2023-12-28 ASSESSMENT — PAIN - FUNCTIONAL ASSESSMENT: PAIN_FUNCTIONAL_ASSESSMENT: 0-10

## 2023-12-28 NOTE — PROGRESS NOTES
Physical Therapy    Physical Therapy    Physical Therapy Treatment    Patient Name: Yarelis Lew  MRN: [id number]  : 1970   Today's Date: 2023     Visit Number: 12      Current Problem  Problem List Items Addressed This Visit             ICD-10-CM       Neuro    Lumbar compression fracture (CMS/HCC) S32.000A        Subjective   Pt reports pain in between her shoulder blades and lumbar spine, as well as weakness in her quads, making standing from a chair difficult.  Precautions   none    Pain  Pain Assessment: 0-10  Pain Score: 4  Pain Type: Chronic pain  Pain Location: Back  Pain Descriptors: Aching    Objective      Pt was able to initiate several treatments for spinal strengthening/core stability. See flow chart for details.    Outcome Measures:  Not today    Treatments:    EXERCISES Date  Date  Date 2023   Date   VISIT# #9 #10 #12 #    REPS REPS REPS REPS           recheck      nustep  L3 10' L3 10 min           Shuttle dlp  4b 10x2                 slp  3b 10x2 ea            SB: DKTC  Gr 10x2 Gr 10x2          LRT  Gr 10x2 Gr 10x2           PPT with hip add  10x2 10 x 2    PPT with hip abd  Yel. 10x2 Yellow 10x2           Tband        row   Yellow x 20    pulldown   Yellow x 20L    er       Bicep curl       Tricep press              Mini squats              bridges   10 x                                                     HEP           Assessment:   Pt was able to initiate several treatments for spinal strengthening/core stability. She tolerated with moderate difficulty due to weakness/fatigue.     Plan:   Pt was instructed to monitor fatigue/soreness levels to gauge activity tolerance. Continue with core strengthening in order to improve tolerance and ability to perform functional activities.      Goals:  Active       muscle weakness; compression fx lumbar vertebrae       Pt. will c/o less than 5/10 back pain with ADL's.        Start:  23    Expected End:  24            Pt.  will be indep with HEP.        Start:  11/03/23    Expected End:  03/03/24            Pt. will increase MAGNO LE strength to wnl to allow increased up and down stairs.        Start:  11/03/23    Expected End:  03/03/24            Pt. will increase core strength to wnl to allow return to housework wfl.        Start:  11/03/23    Expected End:  03/03/24

## 2024-01-02 PROCEDURE — RXMED WILLOW AMBULATORY MEDICATION CHARGE

## 2024-01-07 ENCOUNTER — PHARMACY VISIT (OUTPATIENT)
Dept: PHARMACY | Facility: CLINIC | Age: 54
End: 2024-01-07
Payer: COMMERCIAL

## 2024-01-28 ENCOUNTER — DOCUMENTATION (OUTPATIENT)
Dept: PHYSICAL THERAPY | Facility: HOSPITAL | Age: 54
End: 2024-01-28

## 2024-01-30 ENCOUNTER — TELEPHONE (OUTPATIENT)
Dept: BEHAVIORAL HEALTH | Facility: CLINIC | Age: 54
End: 2024-01-30
Payer: COMMERCIAL

## 2024-02-01 ENCOUNTER — TELEPHONE (OUTPATIENT)
Dept: BEHAVIORAL HEALTH | Facility: CLINIC | Age: 54
End: 2024-02-01
Payer: COMMERCIAL

## 2024-02-05 ENCOUNTER — APPOINTMENT (OUTPATIENT)
Dept: BEHAVIORAL HEALTH | Facility: CLINIC | Age: 54
End: 2024-02-05
Payer: COMMERCIAL

## 2024-02-06 ENCOUNTER — TELEMEDICINE (OUTPATIENT)
Dept: BEHAVIORAL HEALTH | Facility: CLINIC | Age: 54
End: 2024-02-06
Payer: COMMERCIAL

## 2024-02-06 DIAGNOSIS — F43.10 PTSD (POST-TRAUMATIC STRESS DISORDER): ICD-10-CM

## 2024-02-06 PROCEDURE — 90834 PSYTX W PT 45 MINUTES: CPT | Performed by: COUNSELOR

## 2024-02-06 PROCEDURE — 1036F TOBACCO NON-USER: CPT | Performed by: COUNSELOR

## 2024-02-06 NOTE — PROGRESS NOTES
All Individuals Present: Patient and Provider (Encounter Provider)     ID: Yarelis Lew is a 53 y.o. female      Pt met with [facility] for follow-up visit for symptoms of anxiety.    An interactive audio and video telecommunication system which permits real time communications between the patient (at the originating site) and the provider (at the distant site) was utilized to provide this telehealth service. Verbal consent was requested and obtained from Yarelis Lew on this date 02/06/24 for a telehealth visit.    Pt is meeting with therapist as a part of Personalized Care.    Mental Status Exam  General Appearance: Well groomed, appropriate eye contact.  Attitude/Behavior: Cooperative, conversant, engaged, and with good eye contact.  Motor: No psychomotor agitation or retardation, no tremor or other abnormal movements.  Speech: Normal rate, volume, prosody  Gait/Station: Within normal limits  Mood: Normal.  Affect: Euthymic, full-range and Congruent with mood and topic of conversation  Thought Process: Linear, goal directed  Thought Associations: No loosening of associations  Thought Content: normal  Sensorium: Alert and oriented to person, place, time and situation  Insight: Intact, as evidenced by Pt discussion  Judgment: Intact      Risk Assessment:  Imminent Risk of Suicide or Serious Self-Injury: None, denied  Imminent Risk of Violence or Homicide: None, denied    Progress Note:  Pt stated that she is feeling better after having had a few contractors evaluate the house for repairs. She noted that she loves her house and having her own space, but she is also concerned about the continued upkeep. Pt reported that she is worried that she may need to move soon. She identified that she is working to be able to afford the bills, and she is working to the best of her ability now, though she feels that it is not enough. [facility] validated that Pt has to manage her health and it is okay to take her time.    Start Time:  11:05 am  End Time: 11:57 am  CPT Code: 73868    Attestation Statements   Number of minutes spent performing psychotherapy: 52

## 2024-02-14 ENCOUNTER — TELEMEDICINE (OUTPATIENT)
Dept: BEHAVIORAL HEALTH | Facility: CLINIC | Age: 54
End: 2024-02-14
Payer: COMMERCIAL

## 2024-02-14 DIAGNOSIS — F43.10 PTSD (POST-TRAUMATIC STRESS DISORDER): ICD-10-CM

## 2024-02-14 PROCEDURE — 1036F TOBACCO NON-USER: CPT | Performed by: COUNSELOR

## 2024-02-14 PROCEDURE — 90837 PSYTX W PT 60 MINUTES: CPT | Performed by: COUNSELOR

## 2024-02-14 NOTE — PROGRESS NOTES
All Individuals Present: Patient and Provider (Encounter Provider)     ID: Yarelis Lew is a 53 y.o. female      Pt met with [facility] for follow-up visit for symptoms of anxiety related to PTSD.    An interactive audio and video telecommunication system which permits real time communications between the patient (at the originating site) and the provider (at the distant site) was utilized to provide this telehealth service. Verbal consent was requested and obtained from Yarelis Lew on this date 02/14/24 for a telehealth visit.    Pt is meeting with therapist as a part of Personalized Care.    Mental Status Exam  General Appearance: Well groomed, appropriate eye contact.  Attitude/Behavior: Cooperative, conversant, engaged, and with good eye contact.  Motor: No psychomotor agitation or retardation, no tremor or other abnormal movements.  Speech: Normal rate, volume, prosody  Gait/Station: Within normal limits  Mood: Normal.  Affect: Congruent with mood and topic of conversation  Thought Process: Linear, goal directed  Thought Associations: No loosening of associations  Thought Content: normal  Sensorium: Alert and oriented to person, place, time and situation  Insight: Intact, as evidenced by Pt discussion  Judgment: Intact      Risk Assessment:  Imminent Risk of Suicide or Serious Self-Injury: None, denied  Imminent Risk of Violence or Homicide: None, denied    Progress Note:  Pt shared that she is feeling positive today after having a good day a few days ago. She stated that she was able to find a different car situation, which will allow her to have some increased financial independence. Pt shared that she is worried about getting more strength back in her back and wants to continue to improve beyond the year of recovery. She reported that she is hopeful for continued gains. [facility] offered Pt validation that she continues to grieve for the loss of a significant relationship in her life.    Start Time: 11:03  Patient's mother stated patient will be going to [facility] in [facility] for drug rehab starting Monday. Stated that the center is requiring patient to have a Covid 19 test before coming.   Please advise on ordering a covid test? am  End Time: 11:59 am  CPT Code: 83191    Attestation Statements   Number of minutes spent performing psychotherapy: 56

## 2024-02-20 ENCOUNTER — TELEMEDICINE (OUTPATIENT)
Dept: BEHAVIORAL HEALTH | Facility: CLINIC | Age: 54
End: 2024-02-20
Payer: COMMERCIAL

## 2024-02-20 DIAGNOSIS — F43.10 PTSD (POST-TRAUMATIC STRESS DISORDER): ICD-10-CM

## 2024-02-20 PROCEDURE — 90837 PSYTX W PT 60 MINUTES: CPT | Performed by: COUNSELOR

## 2024-02-20 PROCEDURE — 1036F TOBACCO NON-USER: CPT | Performed by: COUNSELOR

## 2024-02-20 NOTE — PROGRESS NOTES
All Individuals Present: Patient and Provider (Encounter Provider)     ID: Yarelis Lew is a 53 y.o. female      Pt met with [facility] for follow-up visit for symptoms of anxiety related to PTSD.    An interactive audio and video telecommunication system which permits real time communications between the patient (at the originating site) and the provider (at the distant site) was utilized to provide this telehealth service. Verbal consent was requested and obtained from Yarelis Lew on this date 02/20/24 for a telehealth visit.    Pt is meeting with therapist as a part of Personalized Care.    Mental Status Exam  General Appearance: Well groomed, appropriate eye contact.  Attitude/Behavior: Cooperative, conversant, engaged, and with good eye contact.  Motor: No psychomotor agitation or retardation, no tremor or other abnormal movements.  Speech: Normal rate, volume, prosody  Gait/Station: Within normal limits  Mood: Normal.  Affect: Congruent with mood and topic of conversation  Thought Process: Linear, goal directed  Thought Associations: No loosening of associations  Thought Content: normal  Sensorium: Alert and oriented to person, place, time and situation  Insight: Intact, as evidenced by Pt discussion  Judgment: Intact      Risk Assessment:  Imminent Risk of Suicide or Serious Self-Injury: None, denied  Imminent Risk of Violence or Homicide: None, denied    Progress Note:  Pt stated that she is working on figuring out what to do with her kitchen ceiling. She shared that she is struggling a little because she has not felt able to trust her intuition after several difficult relationships in the past. [facility] validated Pt and encouraged her to work on trusting herself again. Pt shared that she is working toward letting go of things that no longer serve her. She noted that she feels ready to make a positive change in her life, but she is still working on getting things in order.     Start Time: 12:01 pm  End Time:  1:00 pm  CPT Code: 97780    Attestation Statements   Number of minutes spent performing psychotherapy: 59

## 2024-02-29 ENCOUNTER — PHARMACY VISIT (OUTPATIENT)
Dept: PHARMACY | Facility: CLINIC | Age: 54
End: 2024-02-29
Payer: COMMERCIAL

## 2024-02-29 PROCEDURE — RXMED WILLOW AMBULATORY MEDICATION CHARGE

## 2024-03-05 ENCOUNTER — TELEPHONE (OUTPATIENT)
Dept: BEHAVIORAL HEALTH | Facility: CLINIC | Age: 54
End: 2024-03-05
Payer: COMMERCIAL

## 2024-03-05 NOTE — PROGRESS NOTES
[facility] contacted Pt about scheduling for another appointment. Pt shared that she is feeling positive right now because the weather has been good and it has brightened her mood. She scheduled with the [facility] for Wednesday, March 13 at noon.

## 2024-03-13 ENCOUNTER — TELEMEDICINE (OUTPATIENT)
Dept: BEHAVIORAL HEALTH | Facility: CLINIC | Age: 54
End: 2024-03-13
Payer: COMMERCIAL

## 2024-03-13 DIAGNOSIS — F43.10 PTSD (POST-TRAUMATIC STRESS DISORDER): ICD-10-CM

## 2024-03-13 PROCEDURE — 90834 PSYTX W PT 45 MINUTES: CPT | Performed by: COUNSELOR

## 2024-03-13 PROCEDURE — 1036F TOBACCO NON-USER: CPT | Performed by: COUNSELOR

## 2024-03-13 NOTE — PROGRESS NOTES
All Individuals Present: Patient and Provider (Encounter Provider)     ID: Yarelis Lew is a 53 y.o. female      Pt met with [facility] for follow-up visit for symptoms of anxiety related to PTSD.    An interactive audio and video telecommunication system which permits real time communications between the patient (at the originating site) and the provider (at the distant site) was utilized to provide this telehealth service. Verbal consent was requested and obtained from Yarelis Lew on this date 03/13/24 for a telehealth visit.    Pt is meeting with therapist as a part of Personalized Care.    Mental Status Exam  General Appearance: Well groomed, appropriate eye contact.  Attitude/Behavior: Cooperative, conversant, engaged, and with good eye contact.  Motor: No psychomotor agitation or retardation, no tremor or other abnormal movements.  Speech: Normal rate, volume, prosody  Gait/Station: Within normal limits  Mood: Normal.  Affect: Congruent with mood and topic of conversation  Thought Process: Linear, goal directed  Thought Associations: No loosening of associations  Thought Content: normal  Sensorium: Alert and oriented to person, place, time and situation  Insight: Intact, as evidenced by Pt discussion  Judgment: Intact      Risk Assessment:  Imminent Risk of Suicide or Serious Self-Injury: None, denied  Imminent Risk of Violence or Homicide: None, denied    Progress Note:  Pt shared that she is having difficulty with her negative thoughts today. She noted that she is still healing and is feeling frustrated that the healing process has been slow. Pt was tearful at times and identified that she is feeling frustrated. [facility] validated Pt feelings and encouraged her to move toward neutral self-talk instead of focusing on only positive self-talk. Pt stated that she understands and feels like she will be able to do this.    Start Time: 12:07 pm  End Time: 12:59 pm  CPT Code: 18979    Attestation Statements   Number  of minutes spent performing psychotherapy: 52

## 2024-03-19 ENCOUNTER — TELEMEDICINE (OUTPATIENT)
Dept: BEHAVIORAL HEALTH | Facility: CLINIC | Age: 54
End: 2024-03-19
Payer: COMMERCIAL

## 2024-03-19 DIAGNOSIS — F43.10 PTSD (POST-TRAUMATIC STRESS DISORDER): ICD-10-CM

## 2024-03-19 DIAGNOSIS — G62.9 NEUROPATHY: ICD-10-CM

## 2024-03-19 PROCEDURE — 90837 PSYTX W PT 60 MINUTES: CPT | Performed by: COUNSELOR

## 2024-03-19 PROCEDURE — 1036F TOBACCO NON-USER: CPT | Performed by: STUDENT IN AN ORGANIZED HEALTH CARE EDUCATION/TRAINING PROGRAM

## 2024-03-19 PROCEDURE — 1036F TOBACCO NON-USER: CPT | Performed by: COUNSELOR

## 2024-03-19 PROCEDURE — 99215 OFFICE O/P EST HI 40 MIN: CPT | Performed by: STUDENT IN AN ORGANIZED HEALTH CARE EDUCATION/TRAINING PROGRAM

## 2024-03-19 NOTE — PROGRESS NOTES
Personalized Care Follow-Up    Subjective   Yarelis Lew is a 53 y.o. female with complex PTSD, chronic low back pain 2/2 kyphosis and lumbar compression fracture s/p lumbar fusion (multiple operations) c/b MSSA infection early 2023 and with intermittent opioid treatment (last filled 7/6/23), hx GIB 2/2 duodenal ulcer 2020.  Feelings of abandonment by family and work/healthcare community have led to sadness and isolation.     Previous Treatment Plan  PTSD (post-traumatic stress disorder) F43.10        Making progress with therapy only. Chronic pain improving with PT.  - continue therapy           Gastro-esophageal reflux disease without esophagitis K21.9       No PCP currently, never followed up with GI following hospitalization for GIB, ongoing ROSARIO, noticing symptoms now that are similar to what she had prior to bleeding duodenal ulcer admission.  - refer to GI        Interim History    Emotions/boundaries: Started off rough today, cried a lot, starting to feel better now. Learning new coping skills from Annetta.   Mood: 10/10 a couple of times a week, otherwise she's rolando to be 5/10. This is better than it has been.   Family: They will talk to her but aren't supportive emotionally.  Pain: Today has been a bad day for physical pain as well.  GI not scheduled: didn't deal with it right now because things are under control  Presyncope: not happening so much anymore, had one episodes where her gait was off, wonders if it's the hips - used to be numb, but worse pain lately    Psychiatric Medications  None    Objective   Mental Status Exam:  General Appearance: Fairly groomed  Attitude/Behavior: Cooperative  Motor: No psychomotor agitation or retardation, no tremor or other abnormal movements  Speech: Normal rate, volume, prosody  Mood: up and down - rough today  Affect: Dysphoric, constricted but reactive  Thought Process: Linear, goal directed  Thought Associations: No loosening of associations  Thought Content:  Normal  Perception: No perceptual abnormalities noted  Sensorium: Alert  Insight: Intact  Judgement: Intact  Cognition: Cognitively intact to conversational testing with respect to attention, orientation, fund of knowledge, recent and remote memory, and language    Lab Review:   not applicable    Assessment/Plan     Problem List Items Addressed This Visit             ICD-10-CM    PTSD (post-traumatic stress disorder) F43.10     Overall improving emotion regulation, though challenging in the context of family stress.  - continue with therapist         Relevant Orders    Follow Up In Psychiatry    Neuropathy G62.9     Due to financial issues / miscommunication, stopped doing PT at the end of the year, though it was supposed to keep going for three more months.  - ACO help with restarting PT  - ACO help connect to PCP            Next appointment with me in 1 month(s).    Suicide Risk Assessment  There are no new risk factors for suicide and imminent risk remains low; therefore, Yarelis Lew does not require further risk mitigation.    I provided the mobile crisis number and encouraged calling this, [phone] or 911 in case of a mental health crisis, including feeling suicidal or losing touch with reality.

## 2024-03-19 NOTE — PROGRESS NOTES
All Individuals Present: Patient and Provider (Encounter Provider)     ID: Yarelis Lew is a 53 y.o. female      Pt met with [facility] for follow-up visit for symptoms of anxiety.    An interactive audio and video telecommunication system which permits real time communications between the patient (at the originating site) and the provider (at the distant site) was utilized to provide this telehealth service. Verbal consent was requested and obtained from Yarelis Lew on this date 03/19/24 for a telehealth visit.    Pt is meeting with therapist as a part of Personalized Care.    Mental Status Exam  General Appearance: Well groomed, appropriate eye contact.  Attitude/Behavior: Cooperative, conversant, engaged, and with good eye contact.  Motor: No psychomotor agitation or retardation, no tremor or other abnormal movements.  Speech: Normal rate, volume, prosody  Gait/Station: Within normal limits  Mood: Depressed.  Affect: Sad/tearful and Congruent with mood and topic of conversation  Thought Process: Linear, goal directed  Thought Associations: No loosening of associations  Thought Content: normal  Sensorium: Alert and oriented to person, place, time and situation  Insight: Intact, as evidenced by Pt discussion  Judgment: Intact      Risk Assessment:  Imminent Risk of Suicide or Serious Self-Injury: None, denied  Imminent Risk of Violence or Homicide: None, denied    Progress Note:  Pt expressed that she is feeling very anxious right now. She feels like medication might be helpful, but she is uncertain because she is worried about becoming reliant on it. Pt shared that she is having difficulty feeling that she can trust others. [facility] encouraged Pt to think about how others have eroded her trust in herself through their actions. Pt was reminded to give herself the space to forgive herself for decisions she may have made when the options were limited.    Start Time: 12:01 pm  End Time: 12:59 pm  CPT Code:  34313    Attestation Statements   Number of minutes spent performing psychotherapy: 58

## 2024-03-21 PROCEDURE — RXMED WILLOW AMBULATORY MEDICATION CHARGE

## 2024-03-25 NOTE — ASSESSMENT & PLAN NOTE
Overall improving emotion regulation, though challenging in the context of family stress.  - continue with therapist

## 2024-03-25 NOTE — ASSESSMENT & PLAN NOTE
Due to financial issues / miscommunication, stopped doing PT at the end of the year, though it was supposed to keep going for three more months.  - ACO help with restarting PT  - ACO help connect to PCP

## 2024-03-25 NOTE — PATIENT INSTRUCTIONS
Please send me a message in [employer] if things aren't going as expected or you are unsure about something we discussed. If this isn't working, you can call the psychiatry department line at [phone], or leave me a voicemail at [phone].  If you are having thoughts of harming yourself or ending your life, please call the national suicide hotline 24/7 at 327. For this and other mental health emergencies, such as losing touch with reality, call the [facility] 24/7 mobile crisis hotline at 187-622-9216, or dial 911 for emergency services.

## 2024-03-26 ENCOUNTER — PHARMACY VISIT (OUTPATIENT)
Dept: PHARMACY | Facility: CLINIC | Age: 54
End: 2024-03-26
Payer: COMMERCIAL

## 2024-03-27 ENCOUNTER — TELEMEDICINE (OUTPATIENT)
Dept: BEHAVIORAL HEALTH | Facility: CLINIC | Age: 54
End: 2024-03-27
Payer: COMMERCIAL

## 2024-03-27 DIAGNOSIS — F43.10 PTSD (POST-TRAUMATIC STRESS DISORDER): ICD-10-CM

## 2024-03-27 PROCEDURE — 90837 PSYTX W PT 60 MINUTES: CPT | Performed by: COUNSELOR

## 2024-03-27 NOTE — PROGRESS NOTES
All Individuals Present: Patient and Provider (Encounter Provider)     ID: Yarelis Lew is a 53 y.o. female      Pt met with [facility] for follow-up visit for symptoms of anxiety.    An interactive audio and video telecommunication system which permits real time communications between the patient (at the originating site) and the provider (at the distant site) was utilized to provide this telehealth service. Verbal consent was requested and obtained from Yarelis Lew on this date 03/27/24 for a telehealth visit.    Pt is meeting with therapist as a part of Personalized Care.    Mental Status Exam  General Appearance: Well groomed, appropriate eye contact.  Attitude/Behavior: Cooperative, conversant, engaged, and with good eye contact.  Motor: No psychomotor agitation or retardation, no tremor or other abnormal movements.  Speech: Normal rate, volume, prosody  Gait/Station: Within normal limits  Mood: Anxious and Sad.  Affect: Sad/tearful, Anxious, and Congruent with mood and topic of conversation  Thought Process: Linear, goal directed  Thought Associations: No loosening of associations  Thought Content: normal  Sensorium: Alert and oriented to person, place, time and situation  Insight: Intact, as evidenced by Pt discussion  Judgment: Intact      Risk Assessment:  Imminent Risk of Suicide or Serious Self-Injury: passive suicidal thoughts  Imminent Risk of Violence or Homicide: None, denied    Progress Note:  Pt stated that she is feeling overwhelmed. She was tearful and frustrated with her situation. She noted that she has had some passive SI, but has no desire to harm herself. She feels overwhelmed with life. [facility] reviewed safety measures with Pt. [facility] also offered validation to Pt for her thoughts. Pt stated that she has been using stretching to help her feel better. She reported that her stress is high and her emotions feel very strong, possibly because her PTSD was activated recently and she is handling  fight or flight emotions. Pt will meet with Dr. De La Rosa on Thursday to potentially identify medication that would help her feel improved.    Start Time: 12:00 pm  End Time: 12:59 pm  CPT Code: [id number]    Attestation Statements   Number of minutes spent performing psychotherapy: 59

## 2024-03-28 ENCOUNTER — TELEMEDICINE (OUTPATIENT)
Dept: BEHAVIORAL HEALTH | Facility: CLINIC | Age: 54
End: 2024-03-28
Payer: COMMERCIAL

## 2024-03-28 DIAGNOSIS — F43.10 PTSD (POST-TRAUMATIC STRESS DISORDER): ICD-10-CM

## 2024-03-28 PROCEDURE — RXMED WILLOW AMBULATORY MEDICATION CHARGE

## 2024-03-28 PROCEDURE — 99214 OFFICE O/P EST MOD 30 MIN: CPT | Performed by: STUDENT IN AN ORGANIZED HEALTH CARE EDUCATION/TRAINING PROGRAM

## 2024-03-28 RX ORDER — TOPIRAMATE 25 MG/1
TABLET ORAL
Qty: 63 TABLET | Refills: 0 | Status: SHIPPED | OUTPATIENT
Start: 2024-03-28 | End: 2024-04-16

## 2024-03-28 NOTE — PROGRESS NOTES
Personalized Care Follow-Up    Subjective   Yarelis Lew is a 53 y.o. female with complex PTSD, chronic low back pain 2/2 kyphosis and lumbar compression fracture s/p lumbar fusion (multiple operations) c/b MSSA infection early 2023 and with intermittent opioid treatment (last filled 7/6/23), hx GIB 2/2 duodenal ulcer 2020.  Feelings of abandonment by family and work/healthcare community have led to sadness and isolation.     Previous Treatment Plan  PTSD (post-traumatic stress disorder) F43.10        Overall improving emotion regulation, though challenging in the context of family stress.  - continue with therapist           Relevant Orders     Follow Up In Psychiatry     Neuropathy G62.9       Due to financial issues / miscommunication, stopped doing PT at the end of the year, though it was supposed to keep going for three more months.  - ACO help with restarting PT  - ACO help connect to PCP          Interim History  She reported to our counselor that she has been feeling more depressed since last week and would like to talk to me about starting a medication, which I had offered previously.    Emotional symptoms: Feeling tired and frustrated that she can't get ahead in life physically, mentally, emotionally, etc.  Mirtazapine/duloxetine past experiences: she remembers feeling numb with those, but would be willing to try anything    Topiramate - never tried      Psychiatric Medications  None  Taking gabapentin    Objective   Mental Status Exam:  General Appearance: Well groomed, appropriate eye contact  Attitude/Behavior: Cooperative  Motor: No psychomotor agitation or retardation, no tremor or other abnormal movements  Speech: Normal rate, volume, prosody  Mood: really rough  Affect: Dysphoric, constricted but reactive  Thought Process: Linear, goal directed  Thought Associations: No loosening of associations  Thought Content: Normal  Perception: No perceptual abnormalities noted  Sensorium: Alert  Insight:  Intact  Judgement: Intact  Cognition: Cognitively intact to conversational testing with respect to attention, orientation, fund of knowledge, recent and remote memory, and language    Lab Review:   not applicable    Assessment/Plan     Problem List Items Addressed This Visit             ICD-10-CM    PTSD (post-traumatic stress disorder) F43.10     Topiramate trial: 25 --> 25 BID  Nena (ACO nurse) - help arrange PCP & PT         Relevant Medications    topiramate (Topamax) 25 mg tablet         Next appointment with me in 1 month(s).    Suicide Risk Assessment  There are no new risk factors for suicide and imminent risk remains low; therefore, Yarelis Lew does not require further risk mitigation.    I provided the mobile crisis number and encouraged calling this, 988 or 911 in case of a mental health crisis, including feeling suicidal or losing touch with reality.

## 2024-03-29 ENCOUNTER — PHARMACY VISIT (OUTPATIENT)
Dept: PHARMACY | Facility: CLINIC | Age: 54
End: 2024-03-29
Payer: COMMERCIAL

## 2024-04-01 NOTE — PATIENT INSTRUCTIONS
- Try topiramate to help with emotions  - Continue with Annetta  - Nena is out for the moment but should be back soon to help with PT and PCP    Please send me a message in [employer] if things aren't going as expected or you are unsure about something we discussed. If this isn't working, you can call the psychiatry department line at [phone], or leave me a voicemail at [phone].  If you are having thoughts of harming yourself or ending your life, please call the national suicide hotline 24/7 at 787. For this and other mental health emergencies, such as losing touch with reality, call the [facility] 24/7 mobile crisis hotline at 349-113-3606, or dial 911 for emergency services.

## 2024-04-03 ENCOUNTER — APPOINTMENT (OUTPATIENT)
Dept: BEHAVIORAL HEALTH | Facility: CLINIC | Age: 54
End: 2024-04-03
Payer: COMMERCIAL

## 2024-04-04 ENCOUNTER — PATIENT OUTREACH (OUTPATIENT)
Dept: CARE COORDINATION | Facility: CLINIC | Age: 54
End: 2024-04-04
Payer: COMMERCIAL

## 2024-04-04 NOTE — PROGRESS NOTES
Received message from Dr. De La Rosa:  Please assist Yarelis farnsworth obtaining a PCP appt--also will need to start PT.  Left msg on Yarelis's VM, provided my contact information for return call.

## 2024-04-08 ENCOUNTER — PATIENT OUTREACH (OUTPATIENT)
Dept: CARE COORDINATION | Facility: CLINIC | Age: 54
End: 2024-04-08
Payer: COMMERCIAL

## 2024-04-16 ENCOUNTER — TELEMEDICINE (OUTPATIENT)
Dept: BEHAVIORAL HEALTH | Facility: CLINIC | Age: 54
End: 2024-04-16
Payer: COMMERCIAL

## 2024-04-16 DIAGNOSIS — F43.10 PTSD (POST-TRAUMATIC STRESS DISORDER): ICD-10-CM

## 2024-04-16 PROCEDURE — 90837 PSYTX W PT 60 MINUTES: CPT | Performed by: COUNSELOR

## 2024-04-16 PROCEDURE — 99213 OFFICE O/P EST LOW 20 MIN: CPT | Performed by: STUDENT IN AN ORGANIZED HEALTH CARE EDUCATION/TRAINING PROGRAM

## 2024-04-16 RX ORDER — TOPIRAMATE 25 MG/1
25 TABLET ORAL 2 TIMES DAILY
Qty: 60 TABLET | Refills: 1 | Status: SHIPPED | OUTPATIENT
Start: 2024-04-16 | End: 2024-06-15

## 2024-04-16 NOTE — PROGRESS NOTES
All Individuals Present: Patient and Provider (Encounter Provider)     ID: Yarelis Lew is a 53 y.o. female      Pt met with [facility] for follow-up visit for symptoms of anxiety related to PTSD.    An interactive audio and video telecommunication system which permits real time communications between the patient (at the originating site) and the provider (at the distant site) was utilized to provide this telehealth service. Verbal consent was requested and obtained from Yarelis Lew on this date 04/16/24 for a telehealth visit.    Pt is meeting with therapist as a part of Personalized Care.    Mental Status Exam  Oriented: Person, Place, and Time  Appearance: well groomed, appropriate eye contact  Affect/Mood: anxious  Memory:  intact  Speech:  Normal rate, volume, prosody  Thought:  Abstract reasoning appropriate to age  Judgment: Appropriate to age  Insight: intact  Attitude/Behavior: cooperative    Risk Assessment:  Imminent Risk of Suicide or Serious Self-Injury: None, denied  Imminent Risk of Violence or Homicide: None, denied    Progress Note:  Pt stated that she feels like she is doing a little better today emotionally now that the sun is out and she has been able to get outside and garden. She reported that her home is getting completed and she is feeling more positive about the progress there as well. Pt noted that she has been doing stretching to help her regain mobility and feels that it is working. [facility] gave Pt phone number for  to help her schedule with a PCP so that she can also get updated orders for PT.    Treatment Goal: Reduce anxiety to improve medical outcomes.    Treatment modality/frequency: biweekly individual therapy    Start Time: 2:05 pm  End Time: 3:04 pm  CPT Code: 94277    Attestation Statements   Number of minutes spent performing psychotherapy: 59

## 2024-04-16 NOTE — ASSESSMENT & PLAN NOTE
Emotions not as strong and easier to work with lately, not sure if this is due to better weather, working with Annetta, or medication.  - contiue Topiramate  - Call Wandy about help with PCP and PT  - Continue with Annetta  - Work with impatience

## 2024-04-16 NOTE — PROGRESS NOTES
Personalized Care Follow-Up    Subjective   Yarelis Lew is a 53 y.o. female with complex PTSD, chronic low back pain 2/2 kyphosis and lumbar compression fracture s/p lumbar fusion (multiple operations) c/b MSSA infection early 2023 and with intermittent opioid treatment (last filled 7/6/23), hx GIB 2/2 duodenal ulcer 2020.  Feelings of abandonment by family and work/healthcare community have led to sadness and isolation.     Previous Treatment Plan  PTSD (post-traumatic stress disorder) F43.10        Topiramate trial: 25 --> 25 BID  Nena (ACO nurse) - help arrange PCP & PT           Relevant Medications     topiramate (Topamax) 25 mg tablet       Interim History  Team members have been reporting that she has been more depressed lately, mostly related to loneliness with family stressors.  [facility]O RN has left voicemails trying to help with PCP and PT.    Mood: Was feeling more depressed until a couple of weeks ago, was feeling overwhelmed with contractors still working in her house. However, the holes are now covered with drywall and that gave her a lot of emotional closure as well.  Topiramate: Taking about 1.5 weeks, no adverse effects she has noticed.  Emotions/anger: Not crying so much, although the Spring weather may be part of this. She's getting better at not letting emotions get the best of her. Things that used to get a rise out of her are still causing a reaction, but more tolerable.   Anxiety: As soon as she notices muscles in her back tensing up, she'll do some relaxing yoga to help let go. Does this 2-4 times/day, really helps.    ACO nurse callback: needs to check voicemail    Psychiatric Medications  Topiramate 25 mg BID    Objective   Mental Status Exam:  General Appearance: Well groomed, appropriate eye contact  Attitude/Behavior: Cooperative  Motor: No psychomotor agitation or retardation, no tremor or other abnormal movements  Speech: Normal rate, volume, prosody  Mood: not so bad  Affect:  Euthymic, full-range  Thought Process: Linear, goal directed  Thought Associations: No loosening of associations  Thought Content: Normal  Perception: No perceptual abnormalities noted  Sensorium: Alert  Insight: Intact  Judgement: Intact  Cognition: Cognitively intact to conversational testing with respect to attention, orientation, fund of knowledge, recent and remote memory, and language    Lab Review:   not applicable    Assessment/Plan     Problem List Items Addressed This Visit             ICD-10-CM    PTSD (post-traumatic stress disorder) F43.10     Emotions not as strong and easier to work with lately, not sure if this is due to better weather, working with [employer], or medication.  - contiue Topiramate  - Call Wandy about help with PCP and PT  - Continue with Versium  - Work with impatience         Relevant Medications    topiramate (Topamax) 25 mg tablet    Other Relevant Orders    Follow Up In Psychiatry         Next appointment with me in 1 month(s).    Suicide Risk Assessment  There are no new risk factors for suicide and imminent risk remains low; therefore, Yarelis Lew does not require further risk mitigation.    I provided the mobile crisis number and encouraged calling this, 988 or 911 in case of a mental health crisis, including feeling suicidal or losing touch with reality.

## 2024-05-09 ENCOUNTER — TELEMEDICINE (OUTPATIENT)
Dept: BEHAVIORAL HEALTH | Facility: CLINIC | Age: 54
End: 2024-05-09
Payer: COMMERCIAL

## 2024-05-09 DIAGNOSIS — F43.10 PTSD (POST-TRAUMATIC STRESS DISORDER): ICD-10-CM

## 2024-05-09 PROCEDURE — 90837 PSYTX W PT 60 MINUTES: CPT | Performed by: COUNSELOR

## 2024-05-09 NOTE — PROGRESS NOTES
All Individuals Present: Patient and Provider (Encounter Provider)     ID: Yarelis Lew is a 54 y.o. female      Pt met with [facility] for follow-up visit for symptoms of PTSD.    An interactive audio and video telecommunication system which permits real time communications between the patient (at the originating site) and the provider (at the distant site) was utilized to provide this telehealth service. Verbal consent was requested and obtained from Yarelis Lew on this date 05/09/24 for a telehealth visit.    Pt is meeting with therapist as a part of Personalized Care.    Mental Status Exam  Oriented: Person, Place, and Time  Appearance: well groomed, appropriate eye contact  Affect/Mood: sad/tearful and anxious  Memory:  intact  Speech:  Normal rate, volume, prosody  Thought:  Abstract reasoning appropriate to age  Judgment: Appropriate to age  Insight: intact  Attitude/Behavior: cooperative    Risk Assessment:  Imminent Risk of Suicide or Serious Self-Injury: None, denied  Imminent Risk of Violence or Homicide: None, denied    Progress Note:  Pt stated that she is having a difficult day today. She is feeling overwhelmed by financial and emotional challenges this week. Pt noted that she is not taking her medications and is unable to attend PT right now because of co-pays. [facility] will reach out to Dr. De La Rosa to determine if Pt can talk to the pharmacists to review. [facility] also discussed with Pt alternatives that she could use to continue to exercise and gain mobility at a more cost-effective rate. Pt is going to look into local pools that may have exercise classes she could join. Pt expressed confidence that she is going to be able to reach out to those locations to see about cost. Pt shared that she was able to spend some time with her daughters this week for her birthday.    Treatment Goal: Reduce anxiety to improve medical outcomes.     Treatment modality/frequency: biweekly individual therapy    Start Time:  10:34 am  End Time: 11:30 am  CPT Code: 47560    Attestation Statements   Number of minutes spent performing psychotherapy: 56

## 2024-05-13 RX ORDER — POLYETHYLENE GLYCOL 3350 17 G/17G
POWDER, FOR SOLUTION ORAL EVERY 12 HOURS
COMMUNITY
Start: 2019-02-04

## 2024-05-13 RX ORDER — NAFCILLIN 2 G/100ML
100 INJECTION, SOLUTION INTRAVENOUS EVERY 8 HOURS
COMMUNITY
Start: 2023-05-08

## 2024-05-13 RX ORDER — NALOXONE HYDROCHLORIDE 4 MG/.1ML
SPRAY NASAL
COMMUNITY
Start: 2020-10-01

## 2024-05-13 RX ORDER — DESVENLAFAXINE 50 MG/1
TABLET, EXTENDED RELEASE ORAL
COMMUNITY
Start: 2018-10-29

## 2024-05-13 RX ORDER — CALCIUM CARBONATE 200(500)MG
TABLET,CHEWABLE ORAL
COMMUNITY

## 2024-05-13 RX ORDER — NITROFURANTOIN (MACROCRYSTALS) 100 MG/1
CAPSULE ORAL EVERY 12 HOURS
COMMUNITY

## 2024-05-13 RX ORDER — ALUMINUM HYDROXIDE, MAGNESIUM HYDROXIDE, AND SIMETHICONE 2400; 240; 2400 MG/30ML; MG/30ML; MG/30ML
355 SUSPENSION ORAL
COMMUNITY
Start: 2020-06-18

## 2024-05-13 RX ORDER — BUDESONIDE 3 MG/1
CAPSULE, COATED PELLETS ORAL
COMMUNITY
Start: 2020-08-21

## 2024-05-13 RX ORDER — BISACODYL 5 MG
TABLET, DELAYED RELEASE (ENTERIC COATED) ORAL
COMMUNITY
Start: 2023-05-08

## 2024-05-13 RX ORDER — NITROFURANTOIN 25; 75 MG/1; MG/1
CAPSULE ORAL
COMMUNITY
Start: 2023-02-28

## 2024-05-13 RX ORDER — SUMATRIPTAN SUCCINATE 100 MG/1
TABLET ORAL
COMMUNITY
Start: 2020-02-27

## 2024-05-13 RX ORDER — ASCORBIC ACID/MULTIVIT-MIN 1000 MG
1 EFFERVESCENT POWDER IN PACKET ORAL
COMMUNITY

## 2024-05-14 ENCOUNTER — TELEMEDICINE (OUTPATIENT)
Dept: BEHAVIORAL HEALTH | Facility: CLINIC | Age: 54
End: 2024-05-14
Payer: COMMERCIAL

## 2024-05-14 ENCOUNTER — DOCUMENTATION (OUTPATIENT)
Dept: BEHAVIORAL HEALTH | Facility: CLINIC | Age: 54
End: 2024-05-14

## 2024-05-14 DIAGNOSIS — F43.10 PTSD (POST-TRAUMATIC STRESS DISORDER): ICD-10-CM

## 2024-05-14 PROCEDURE — 99215 OFFICE O/P EST HI 40 MIN: CPT | Performed by: STUDENT IN AN ORGANIZED HEALTH CARE EDUCATION/TRAINING PROGRAM

## 2024-05-14 PROCEDURE — 99417 PROLNG OP E/M EACH 15 MIN: CPT | Performed by: STUDENT IN AN ORGANIZED HEALTH CARE EDUCATION/TRAINING PROGRAM

## 2024-05-14 NOTE — ASSESSMENT & PLAN NOTE
In emotional crisis today after losing her temper at a coworker and being placed on administrative leave.  - engage CHW - financial support in general (while off work), in case of losing job, and to help afford medications  - engage pharmacist to look into copay issues and pickup location  - refer to IOP  - suicide precautions as below  - therapist will see today or tomorrow  - close follow up with me
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
## 2024-05-14 NOTE — PATIENT INSTRUCTIONS
- go for a walk outside  - complete a suicide safety plan    As discussed, please complete a suicide safety plan today; we'll review it next time.  Suicide safety plan allan:  https://www.suicidesafetyplan.allan/   Suicide safety plan (printable):  https://bgg.11b.tolingo/wp-content/uploads/2021/08/Jocelynn-Safety-Plan-8-6-21.pdf    Please send me a message in [employer] if things aren't going as expected or you are unsure about something we discussed. If this isn't working, you can call the psychiatry department line at [phone], or leave me a voicemail at [phone].  If you are having thoughts of harming yourself or ending your life, please call the national suicide hotline 24/7 at 709. For this and other mental health emergencies, such as losing touch with reality, call the [facility] 24/7 mobile crisis hotline at 077-210-6409, or dial 911 for emergency services.
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
## 2024-05-21 ENCOUNTER — APPOINTMENT (OUTPATIENT)
Dept: BEHAVIORAL HEALTH | Facility: CLINIC | Age: 54
End: 2024-05-21
Payer: COMMERCIAL

## 2024-05-21 NOTE — PROGRESS NOTES
"Psychiatry Access Clinic Follow-Up    Subjective   Yarelis Lew is a 54 y.o. female with PTSD, chronic low back pain 2/2 kyphosis and lumbar compression fracture s/p lumbar fusion (multiple operations) c/b MSSA infection early 2023 and with intermittent opioid treatment (last filled 7/6/23), hx GIB 2/2 duodenal ulcer 2020.  Feelings of abandonment by family and work/healthcare community have led to sadness and isolation.     Previous Treatment Plan  PTSD (post-traumatic stress disorder) F43.10        In emotional crisis today after losing her temper at a coworker and being placed on administrative leave.  - engage CHW - financial support in general (while off work), in case of losing job, and to help afford medications  - engage pharmacist to look into copay issues and pickup location  - refer to IOP  - suicide precautions as below  - therapist will see today or tomorrow  - close follow up with me           Relevant Orders     Follow Up In Psychiatry     Referral to Clinical Pharmacy       Interim History  Since then, she has met with our therapist but also found out that she did lose her job. Our CHWs are assigned to help with getting Medicaid and other social issues as needed.  She didn't schedule with IOP because they called right after she found out about her job and was in too much distress.    Job loss:   Mood:   Got Topiramate?  IOP interest?    Psychiatric Medications  Topiramate 25 mg BID - not taking due to copays, for a few weeks     Objective   Mental Status Exam:       Lab Review:   {Recent labs:19471::\"not applicable\"}    Assessment/Plan     Problem List Items Addressed This Visit    None      {Follow Up:56953}    Suicide Risk Assessment  {TDSILIST:51503}    "

## 2024-05-23 ENCOUNTER — DOCUMENTATION (OUTPATIENT)
Dept: BEHAVIORAL HEALTH | Facility: CLINIC | Age: 54
End: 2024-05-23
Payer: COMMERCIAL

## 2024-05-23 NOTE — PROGRESS NOTES
Therapist reached out to Pt to make sure the Community Health Worker can contact her with supports. Pt agreed. Therapist emailed Christine to engage Pt in outreach.

## 2024-05-28 ENCOUNTER — APPOINTMENT (OUTPATIENT)
Dept: BEHAVIORAL HEALTH | Facility: CLINIC | Age: 54
End: 2024-05-28
Payer: COMMERCIAL

## 2024-05-28 ENCOUNTER — TELEMEDICINE (OUTPATIENT)
Dept: BEHAVIORAL HEALTH | Facility: CLINIC | Age: 54
End: 2024-05-28
Payer: COMMERCIAL

## 2024-05-28 DIAGNOSIS — F43.10 PTSD (POST-TRAUMATIC STRESS DISORDER): ICD-10-CM

## 2024-05-28 PROCEDURE — 90837 PSYTX W PT 60 MINUTES: CPT | Performed by: COUNSELOR

## 2024-05-28 NOTE — PROGRESS NOTES
Personalized Care Follow-Up    Subjective   Yarelis Lew is a 54 y.o. female with PTSD, chronic low back pain 2/2 kyphosis and lumbar compression fracture s/p lumbar fusion (multiple operations) c/b MSSA infection early 2023 and with intermittent opioid treatment (last filled 7/6/23), hx GIB 2/2 duodenal ulcer 2020.  Feelings of abandonment by family and work/healthcare community have led to sadness and isolation.      Previous Treatment Plan         PTSD (post-traumatic stress disorder) F43.10          In emotional crisis today after losing her temper at a coworker and being placed on administrative leave.  - engage CHW - financial support in general (while off work), in case of losing job, and to help afford medications  - engage pharmacist to look into copay issues and pickup location  - refer to IOP  - suicide precautions as below  - therapist will see today or tomorrow  - close follow up with me           Relevant Orders      Follow Up In Psychiatry      Referral to Clinical Pharmacy          Interim History  Since then, she has met with our therapist but also found out that she did lose her job. Our CHWs are assigned to help with getting Medicaid and other social issues as needed.  She didn't schedule with IOP because they called right after she found out about her job and was in too much distress.     Job loss:   Mood:   Got Topiramate?  IOP interest?     Psychiatric Medications  Topiramate 25 mg BID - not taking due to copays, for a few weeks       Objective   Mental Status Exam:       Lab Review:   not applicable    Assessment/Plan     Problem List Items Addressed This Visit    None      {Follow Up:66379}    Suicide Risk Assessment  {TDSILIST:47627}

## 2024-05-28 NOTE — PROGRESS NOTES
All Individuals Present: Patient and Provider (Encounter Provider)     ID: Yarelis Lew is a 54 y.o. female      Pt met with [facility] for follow-up visit for symptoms of PTSD.    An interactive audio and video telecommunication system which permits real time communications between the patient (at the originating site) and the provider (at the distant site) was utilized to provide this telehealth service. Verbal consent was requested and obtained from Yarelis Lew on this date 05/28/24 for a telehealth visit.    Pt is meeting with therapist as a part of Personalized Care.    Mental Status Exam  Oriented: Person, Place, and Time  Appearance: well groomed, appropriate eye contact  Affect/Mood: congruent with mood and topic of conversation  Memory:  intact  Speech:  Normal rate, volume, prosody  Thought:  Abstract reasoning appropriate to age  Judgment: Appropriate to age  Insight: intact  Attitude/Behavior: cooperative    Risk Assessment:  Imminent Risk of Suicide or Serious Self-Injury: None, denied  Imminent Risk of Violence or Homicide: None, denied    Progress Note:  Pt reported that she is starting to come to terms with her current situation. She noted that she is feeling better that she does not have to continue going to the office where she was not appreciated. Pt stated that she is starting to feel that things are changing. She shared that she would have stayed in a negative situation for the remainder of her career, but she recognizes that she needed to make a change for herself. [facility] validated Pt for her positive mindset and working towards a positive outcome.     Treatment Goal: Reduce anxiety to improve medical outcomes.     Treatment modality/frequency: biweekly individual therapy    Start Time: 12:04 pm  End Time: 1:00 pm  CPT Code: 11096    Attestation Statements   Number of minutes spent performing psychotherapy: 56

## 2024-06-05 ENCOUNTER — TRANSCRIBE ORDERS (OUTPATIENT)
Dept: ORTHOPEDIC SURGERY | Facility: HOSPITAL | Age: 54
End: 2024-06-05
Payer: COMMERCIAL

## 2024-06-05 DIAGNOSIS — Z98.1 STATUS POST LUMBAR SPINAL FUSION: ICD-10-CM

## 2024-06-07 ENCOUNTER — APPOINTMENT (OUTPATIENT)
Dept: BEHAVIORAL HEALTH | Facility: CLINIC | Age: 54
End: 2024-06-07
Payer: COMMERCIAL

## 2024-06-11 ENCOUNTER — APPOINTMENT (OUTPATIENT)
Dept: ORTHOPEDIC SURGERY | Facility: CLINIC | Age: 54
End: 2024-06-11
Payer: COMMERCIAL

## 2024-06-13 ENCOUNTER — APPOINTMENT (OUTPATIENT)
Dept: PHARMACY | Facility: HOSPITAL | Age: 54
End: 2024-06-13
Payer: COMMERCIAL

## 2024-06-13 DIAGNOSIS — F43.10 PTSD (POST-TRAUMATIC STRESS DISORDER): ICD-10-CM

## 2024-06-13 NOTE — PROGRESS NOTES
"Subjective   Patient ID: Yarelis Lew is a 54 y.o. female who presents for Follow-up (Med Cost Help).currently uninsured and needs cost assistance for dicloxacillin, gabapentin, cylcobenzaprine and Topiramate. Patient has Rx's at [facility]. Patient is awaiting determination from medicaid/disability.     30-day supply cash price from [facility]:     Topiramate: $7.34  Cyclo: $7.03  Gabapentin $7.52  Dicloxacillin: $40.33    Total: $62.22      Looking to get coverage for medicaid/disability.     Pharmacy: [facility]? Hoping for mail order the . May have employee benefits        Assessment/Plan   Problem List Items Addressed This Visit       PTSD (post-traumatic stress disorder)    Relevant Orders    Follow Up In [facility] - Pharmacy       LAB RESULTS:  Lab Results   Component Value Date    HGBA1C 5.8 (A) 12/30/2022     Lab Results   Component Value Date    CREATININE 0.49 (L) 09/28/2023      Lab Results   Component Value Date    CHOL 148 12/30/2022    CHOL 154 02/04/2020    CHOL 155 06/21/2018     Lab Results   Component Value Date    HDL 62.1 12/30/2022    HDL 54.7 02/04/2020    HDL 55.7 06/21/2018       No results found for: \"LDLCALC\"  Lab Results   Component Value Date    TRIG 86 12/30/2022    TRIG 124 02/04/2020    TRIG 85 06/21/2018       Lab Results   Component Value Date    IJMVWFGI34 1,600 (H) 05/02/2023       Continue all meds under the continuation of care with the referring provider and clinical pharmacy team.    Follow-up 2 weeks; patient to call [facility] to fill requested meds at cash price for delivery to patient home. Patient to call sooner than scheduled appt if any needs or has a determination from Medicaid/Disability.     "

## 2024-06-17 ENCOUNTER — APPOINTMENT (OUTPATIENT)
Dept: ORTHOPEDIC SURGERY | Facility: CLINIC | Age: 54
End: 2024-06-17
Payer: COMMERCIAL

## 2024-06-26 ENCOUNTER — APPOINTMENT (OUTPATIENT)
Dept: PHARMACY | Facility: HOSPITAL | Age: 54
End: 2024-06-26
Payer: COMMERCIAL

## 2024-06-26 DIAGNOSIS — F43.10 PTSD (POST-TRAUMATIC STRESS DISORDER): ICD-10-CM

## 2024-06-26 PROCEDURE — RXMED WILLOW AMBULATORY MEDICATION CHARGE

## 2024-06-26 NOTE — PROGRESS NOTES
"Subjective   Patient ID: Yarelis Lew is a 54 y.o. female who presents for Follow-up (Cost help). Recently received medicaid number and plans to pursue mail order through .     Assessment/Plan   Problem List Items Addressed This Visit       PTSD (post-traumatic stress disorder)       LAB RESULTS:  Lab Results   Component Value Date    HGBA1C 5.8 (A) 12/30/2022     Lab Results   Component Value Date    CREATININE 0.49 (L) 09/28/2023      Lab Results   Component Value Date    CHOL 148 12/30/2022    CHOL 154 02/04/2020    CHOL 155 06/21/2018     Lab Results   Component Value Date    HDL 62.1 12/30/2022    HDL 54.7 02/04/2020    HDL 55.7 06/21/2018       No results found for: \"LDLCALC\"  Lab Results   Component Value Date    TRIG 86 12/30/2022    TRIG 124 02/04/2020    TRIG 85 06/21/2018       Lab Results   Component Value Date    [id number] 1,600 (H) 05/02/2023       Continue all meds under the continuation of care with the referring provider and clinical pharmacy team.    Obtain new PCP and to receive new refills on medications. Called [facility] for [facility] transfer of topiramate, cyclobenzaprine, gabapentin and dicloxacillin for home delivery; they will contact patient soon to set up; No follow-up needed at this time, but patient has this writer's contact info for any further needs.     "

## 2024-06-28 ENCOUNTER — TELEMEDICINE (OUTPATIENT)
Dept: BEHAVIORAL HEALTH | Facility: CLINIC | Age: 54
End: 2024-06-28
Payer: MEDICAID

## 2024-06-28 ENCOUNTER — PHARMACY VISIT (OUTPATIENT)
Dept: PHARMACY | Facility: CLINIC | Age: 54
End: 2024-06-28
Payer: MEDICAID

## 2024-06-28 DIAGNOSIS — F43.10 PTSD (POST-TRAUMATIC STRESS DISORDER): ICD-10-CM

## 2024-06-28 PROCEDURE — 90837 PSYTX W PT 60 MINUTES: CPT | Performed by: COUNSELOR

## 2024-06-28 NOTE — PROGRESS NOTES
All Individuals Present: Patient and Provider (Encounter Provider)     ID: Yarelis Lew is a 54 y.o. female      Pt met with [facility] for follow-up visit for symptoms of PTSD.    An interactive audio and video telecommunication system which permits real time communications between the patient (at the originating site) and the provider (at the distant site) was utilized to provide this telehealth service. Verbal consent was requested and obtained from Yarelis Lew on this date 06/28/24 for a telehealth visit.    Pt is meeting with therapist as a part of Personalized Care.    Mental Status Exam  Oriented: Person, Place, and Time  Appearance: well groomed, appropriate eye contact  Affect/Mood: anxious  Memory:  intact  Speech:  Normal rate, volume, prosody  Thought:  Abstract reasoning appropriate to age  Judgment: Appropriate to age  Insight: intact  Attitude/Behavior: cooperative    Risk Assessment:  Imminent Risk of Suicide or Serious Self-Injury: None, denied  Imminent Risk of Violence or Homicide: None, denied    Progress Note:  Pt discussed the challenges she has been dealing with over the past few weeks. She shared that she is processing the changes in her life since losing her job. Pt reported that she feels reduced stress in some areas, but is still having some stress in other areas. Pt noted that she feels like the PTSD that she has experienced in her life has impacted her in this situation. However, Pt did note that  she is working toward forgiveness in her life and wants to maintain her positive outlook.    Treatment Goal: Reduce anxiety to improve medical outcomes.     Treatment modality/frequency: biweekly individual therapy    Start Time: 2:04 pm  End Time: 2:57 pm  CPT Code: 59777    Attestation Statements   Number of minutes spent performing psychotherapy: 53

## 2024-07-01 ENCOUNTER — TRANSCRIBE ORDERS (OUTPATIENT)
Dept: ORTHOPEDIC SURGERY | Facility: HOSPITAL | Age: 54
End: 2024-07-01
Payer: MEDICAID

## 2024-07-01 DIAGNOSIS — M54.50 LOW BACK PAIN, UNSPECIFIED BACK PAIN LATERALITY, UNSPECIFIED CHRONICITY, UNSPECIFIED WHETHER SCIATICA PRESENT: ICD-10-CM

## 2024-07-02 ENCOUNTER — HOSPITAL ENCOUNTER (OUTPATIENT)
Dept: RADIOLOGY | Facility: CLINIC | Age: 54
Discharge: HOME | End: 2024-07-02
Payer: MEDICAID

## 2024-07-02 ENCOUNTER — OFFICE VISIT (OUTPATIENT)
Dept: ORTHOPEDIC SURGERY | Facility: CLINIC | Age: 54
End: 2024-07-02
Payer: MEDICAID

## 2024-07-02 VITALS — HEIGHT: 67 IN | BODY MASS INDEX: 21.03 KG/M2 | WEIGHT: 134 LBS

## 2024-07-02 DIAGNOSIS — M54.50 LOW BACK PAIN, UNSPECIFIED BACK PAIN LATERALITY, UNSPECIFIED CHRONICITY, UNSPECIFIED WHETHER SCIATICA PRESENT: ICD-10-CM

## 2024-07-02 DIAGNOSIS — Z98.1 STATUS POST LUMBAR SPINAL FUSION: Primary | ICD-10-CM

## 2024-07-02 PROCEDURE — 72110 X-RAY EXAM L-2 SPINE 4/>VWS: CPT

## 2024-07-02 PROCEDURE — 72110 X-RAY EXAM L-2 SPINE 4/>VWS: CPT | Performed by: RADIOLOGY

## 2024-07-02 PROCEDURE — 99213 OFFICE O/P EST LOW 20 MIN: CPT | Performed by: ORTHOPAEDIC SURGERY

## 2024-07-02 ASSESSMENT — PAIN - FUNCTIONAL ASSESSMENT: PAIN_FUNCTIONAL_ASSESSMENT: NO/DENIES PAIN

## 2024-07-02 NOTE — LETTER
July 3, 2024     Indra Dyer, APRN-CNP  [address]  [facility] [address]    Patient: Yarelis Lew   YOB: 1970   Date of Visit: 7/2/2024       Dear Dr. Indra Dyer, APRN-CNP:    Thank you for referring Yarelis Lew to me for evaluation. Below are my notes for this consultation.  If you have questions, please do not hesitate to call me. I look forward to following your patient along with you.       Sincerely,     Kishan Schaefer MD      CC: No Recipients  ______________________________________________________________________________________    HPI:Yarelis Lew is a 54-year-old woman, who comes in for her 1 year postop visit after her extensive thoracolumbar osteotomy and fusion.  She reports that she is doing very well.  She is very pleased with her 1 year results.  She is standing erect.  She has no significant pain.  She is very grateful.      ROS:  Reviewed on EMR and patient intake sheet.    PMH/SH:  Reviewed on EMR and patient intake sheet.    Exam:  Physical Exam    Constitutional: Well appearing; no acute distress  Eyes: pupils are equal and round  Psych: normal affect  Respiratory: non-labored breathing  Cardiovascular: regular rate and rhythm  GI: non-distended abdomen  Musculoskeletal: no pain with range of motion of the hips bilaterally  Neurologic: [4+]/5 strength in the lower extremities bilaterally]; [    Radiology:     X-rays personally reviewed.  Stable coronal and sagittal alignment.  Stable instrumentation.    Diagnosis:    Status post lumbar fusion    Assessment and Plan:   54-year-old woman 1 year status post extensive thoracolumbar osteotomy and fusion.  She is doing very well.  She is very grateful for her care.  At this time we will see her back as needed.    The patient was in agreement with the plan. At the end of the visit today, the patient felt that all questions had been answered satisfactorily.  The patient was  pleased with the visit and very appreciative for the care rendered.     Thank you very much for the kind referral.  It is a privilege, and a pleasure, to partner with you in the care of your patients.  I would be delighted to assist you with any further consultations as needed.          Kishan Schaefer MD    Chief of Spine Surgery, [facility]  Director of Spine Service, [facility]  , Department of Orthopaedics  [facility] [employer] [facility]  [address] [place] [address]  P: [phone]  [facility]    This note was dictated with voice recognition software.  It has not been proofread for grammatical errors, typographical mistakes or other semantic inconsistencies.

## 2024-07-03 NOTE — PROGRESS NOTES
HPI:Yarelis Lew is a 54-year-old woman, who comes in for her 1 year postop visit after her extensive thoracolumbar osteotomy and fusion.  She reports that she is doing very well.  She is very pleased with her 1 year results.  She is standing erect.  She has no significant pain.  She is very grateful.      ROS:  Reviewed on EMR and patient intake sheet.    PMH/SH:  Reviewed on EMR and patient intake sheet.    Exam:  Physical Exam    Constitutional: Well appearing; no acute distress  Eyes: pupils are equal and round  Psych: normal affect  Respiratory: non-labored breathing  Cardiovascular: regular rate and rhythm  GI: non-distended abdomen  Musculoskeletal: no pain with range of motion of the hips bilaterally  Neurologic: [4+]/5 strength in the lower extremities bilaterally]; [    Radiology:     X-rays personally reviewed.  Stable coronal and sagittal alignment.  Stable instrumentation.    Diagnosis:    Status post lumbar fusion    Assessment and Plan:   54-year-old woman 1 year status post extensive thoracolumbar osteotomy and fusion.  She is doing very well.  She is very grateful for her care.  At this time we will see her back as needed.    The patient was in agreement with the plan. At the end of the visit today, the patient felt that all questions had been answered satisfactorily.  The patient was pleased with the visit and very appreciative for the care rendered.     Thank you very much for the kind referral.  It is a privilege, and a pleasure, to partner with you in the care of your patients.  I would be delighted to assist you with any further consultations as needed.          Kishan Schaefer MD    Chief of Spine Surgery, [facility]  Director of Spine Service, [facility]  , Department of Orthopaedics  [facility] [employer]  [address] [place] [address]  P:  [phone]  [facility]    This note was dictated with voice recognition software.  It has not been proofread for grammatical errors, typographical mistakes or other semantic inconsistencies.

## 2024-07-11 ENCOUNTER — APPOINTMENT (OUTPATIENT)
Dept: BEHAVIORAL HEALTH | Facility: CLINIC | Age: 54
End: 2024-07-11
Payer: MEDICAID

## 2024-07-18 ENCOUNTER — APPOINTMENT (OUTPATIENT)
Dept: BEHAVIORAL HEALTH | Facility: CLINIC | Age: 54
End: 2024-07-18
Payer: MEDICAID

## 2024-07-18 DIAGNOSIS — F43.10 PTSD (POST-TRAUMATIC STRESS DISORDER): ICD-10-CM

## 2024-07-18 PROCEDURE — 90837 PSYTX W PT 60 MINUTES: CPT | Performed by: COUNSELOR

## 2024-07-18 NOTE — PROGRESS NOTES
All Individuals Present: Patient and Provider (Encounter Provider)     ID: Yarelis Lew is a 54 y.o. female      Pt met with [facility] for follow-up visit for symptoms of PTSD.    An interactive audio and video telecommunication system which permits real time communications between the patient (at the originating site) and the provider (at the distant site) was utilized to provide this telehealth service. Verbal consent was requested and obtained from Yarelis Lew on this date 07/18/24 for a telehealth visit.    Pt is meeting with therapist as a part of Personalized Care.    Mental Status Exam  Oriented: Person, Place, and Time  Appearance: well groomed, appropriate eye contact  Affect/Mood: sad/tearful and anxious  Memory:  intact  Speech:  Normal rate, volume, prosody  Thought:  Abstract reasoning appropriate to age  Judgment: Appropriate to age  Insight: intact  Attitude/Behavior: cooperative    Risk Assessment:  Imminent Risk of Suicide or Serious Self-Injury: None, denied  Imminent Risk of Violence or Homicide: None, denied    Progress Note:  Pt shared that she is feeling very lonely today. She has reached out to someone she enjoys talking to and that person rejected her, which has shaken her self-confidence. [facility] encouraged Pt to challenge her thinking when she hears negative messages about herself.    Treatment Goal: Reduce anxiety to improve medical outcomes.     Treatment modality/frequency: weekly individual therapy    Start Time: 2:02 pm  End Time: 3:01 pm  CPT Code: 52902    Attestation Statements   Number of minutes spent performing psychotherapy: 59

## 2024-07-25 ENCOUNTER — APPOINTMENT (OUTPATIENT)
Dept: BEHAVIORAL HEALTH | Facility: CLINIC | Age: 54
End: 2024-07-25
Payer: MEDICAID

## 2024-07-25 DIAGNOSIS — F43.10 PTSD (POST-TRAUMATIC STRESS DISORDER): ICD-10-CM

## 2024-07-25 PROCEDURE — 90837 PSYTX W PT 60 MINUTES: CPT | Performed by: COUNSELOR

## 2024-07-25 NOTE — PROGRESS NOTES
All Individuals Present: Patient and Provider (Encounter Provider)     ID: Yarelis Lew is a 54 y.o. female      Pt met with [facility] for follow-up visit for symptoms of PTSD.    An interactive audio and video telecommunication system which permits real time communications between the patient (at the originating site) and the provider (at the distant site) was utilized to provide this telehealth service. Verbal consent was requested and obtained from Yarelis Lew on this date 07/25/24 for a telehealth visit.    Pt is meeting with therapist as a part of Personalized Care.    Mental Status Exam  Oriented: Person, Place, and Time  Appearance: well groomed, appropriate eye contact  Affect/Mood: sad/tearful and anxious  Memory:  intact  Speech:  Normal rate, volume, prosody  Thought:  Abstract reasoning appropriate to age  Judgment: Appropriate to age  Insight: intact  Attitude/Behavior: cooperative    Risk Assessment:  Imminent Risk of Suicide or Serious Self-Injury: None, denied  Imminent Risk of Violence or Homicide: None, denied    Progress Note:  Pt shared that she is feeling lonely today due to estranged relationships with her family. She processed some thoughts about past experiences with her family which might have impacted current relationships. [facility] validated Pt concerns and encouraged Pt to challenge her beliefs that something is wrong with her.    Treatment Goal: Reduce anxiety to improve medical outcomes.     Treatment modality/frequency: biweekly individual therapy    Start Time: 2:02 pm  End Time: 3:01 pm  CPT Code: 68253    Attestation Statements   Number of minutes spent performing psychotherapy: 59

## 2024-08-01 ENCOUNTER — APPOINTMENT (OUTPATIENT)
Dept: BEHAVIORAL HEALTH | Facility: CLINIC | Age: 54
End: 2024-08-01
Payer: MEDICAID

## 2024-08-01 DIAGNOSIS — F43.10 PTSD (POST-TRAUMATIC STRESS DISORDER): ICD-10-CM

## 2024-08-01 PROCEDURE — 90832 PSYTX W PT 30 MINUTES: CPT | Performed by: COUNSELOR

## 2024-08-01 NOTE — PROGRESS NOTES
All Individuals Present: Patient and Provider (Encounter Provider)     ID: Yarelis Lew is a 54 y.o. female      Pt met with [facility] for follow-up visit for symptoms of PTSD.    An interactive audio and video telecommunication system which permits real time communications between the patient (at the originating site) and the provider (at the distant site) was utilized to provide this telehealth service. Verbal consent was requested and obtained from Yarelis Lew on this date 08/01/24 for a telehealth visit.    Pt is meeting with therapist as a part of Personalized Care.    Mental Status Exam  Oriented: Person, Place, and Time  Appearance: well groomed, appropriate eye contact  Affect/Mood: anxious  Memory:  intact  Speech:  Normal rate, volume, prosody  Thought:  Abstract reasoning appropriate to age  Judgment: Appropriate to age  Insight: intact  Attitude/Behavior: cooperative    Risk Assessment:  Imminent Risk of Suicide or Serious Self-Injury: None, denied  Imminent Risk of Violence or Homicide: None, denied    Progress Note:  Pt stated that she is considering returning to [employer] to help her build more feelings of hope and connection with others. She noted that she is feeling anxious about filling out paperwork for disability, as she would like to still be able to work if possible. [facility] validated Pt feelings and encouraged her to keep working on herself.    Treatment Goal: Reduce anxiety to improve medical outcomes.     Treatment modality/frequency: biweekly individual therapy    Start Time: 1:03 pm  End Time: 1:30 pm  CPT Code: 90444    Attestation Statements   Number of minutes spent performing psychotherapy: 27

## 2024-08-06 ENCOUNTER — APPOINTMENT (OUTPATIENT)
Dept: BEHAVIORAL HEALTH | Facility: CLINIC | Age: 54
End: 2024-08-06
Payer: MEDICAID

## 2024-08-06 DIAGNOSIS — F43.10 PTSD (POST-TRAUMATIC STRESS DISORDER): ICD-10-CM

## 2024-08-06 PROCEDURE — 90837 PSYTX W PT 60 MINUTES: CPT | Performed by: COUNSELOR
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
## 2024-08-09 ENCOUNTER — PHARMACY VISIT (OUTPATIENT)
Dept: PHARMACY | Facility: CLINIC | Age: 54
End: 2024-08-09
Payer: MEDICAID

## 2024-08-09 DIAGNOSIS — Z98.1 STATUS POST LUMBAR SPINAL FUSION: Primary | ICD-10-CM

## 2024-08-09 DIAGNOSIS — M54.16 LUMBAR RADICULOPATHY: ICD-10-CM

## 2024-08-09 PROCEDURE — RXMED WILLOW AMBULATORY MEDICATION CHARGE

## 2024-08-09 RX ORDER — PREDNISONE 10 MG/1
TABLET ORAL
Qty: 24 TABLET | Refills: 0 | Status: SHIPPED | OUTPATIENT
Start: 2024-08-09 | End: 2024-08-16

## 2024-08-15 ENCOUNTER — APPOINTMENT (OUTPATIENT)
Dept: BEHAVIORAL HEALTH | Facility: CLINIC | Age: 54
End: 2024-08-15
Payer: MEDICAID

## 2024-08-15 DIAGNOSIS — F43.10 PTSD (POST-TRAUMATIC STRESS DISORDER): ICD-10-CM

## 2024-08-15 PROCEDURE — 90837 PSYTX W PT 60 MINUTES: CPT | Performed by: COUNSELOR

## 2024-08-15 NOTE — PROGRESS NOTES
All Individuals Present: Patient and Provider (Encounter Provider)     ID: Yarelis Lew is a 54 y.o. female      Pt met with [facility] for follow-up visit for symptoms of PTSD.    An interactive audio and video telecommunication system which permits real time communications between the patient (at the originating site) and the provider (at the distant site) was utilized to provide this telehealth service. Verbal consent was requested and obtained from Yarelis Lew on this date 08/15/24 for a telehealth visit.    Pt is meeting with therapist as a part of Personalized Care.    Mental Status Exam  Oriented: Person, Place, and Time  Appearance: well groomed, appropriate eye contact  Affect/Mood: sad/tearful and anxious  Memory:  intact  Speech:  Normal rate, volume, prosody  Thought:  Abstract reasoning appropriate to age  Judgment: Appropriate to age  Insight: intact  Attitude/Behavior: cooperative    Risk Assessment:  Imminent Risk of Suicide or Serious Self-Injury: None, denied  Imminent Risk of Violence or Homicide: None, denied    Progress Note:  Pt shared that she has been feeling about the same over the past few weeks. She reported that she is tired from dealing with all of the stress of her life right now. Pt stated that she is struggling with feelings of guilt for past relationships, but does not really know why she would need to feel guilt. [facility] encouraged Pt to challenge the thoughts that she is wrong and to replace that thought with self-compassion.    Treatment Goal: Reduce anxiety to improve medical outcomes.     Treatment modality/frequency: weekly individual therapy    Start Time: 1:04 pm  End Time: 2:01 pm  CPT Code: 57589    Attestation Statements   Number of minutes spent performing psychotherapy: 57

## 2024-08-22 ENCOUNTER — APPOINTMENT (OUTPATIENT)
Dept: BEHAVIORAL HEALTH | Facility: CLINIC | Age: 54
End: 2024-08-22
Payer: MEDICAID

## 2024-08-22 DIAGNOSIS — F43.10 PTSD (POST-TRAUMATIC STRESS DISORDER): ICD-10-CM

## 2024-08-22 PROCEDURE — 90837 PSYTX W PT 60 MINUTES: CPT | Performed by: COUNSELOR

## 2024-08-22 NOTE — PROGRESS NOTES
All Individuals Present: Patient and Provider (Encounter Provider)     ID: Yarelis Lew is a 54 y.o. female      Pt met with [facility] for follow-up visit for symptoms of PTSD.    An interactive audio and video telecommunication system which permits real time communications between the patient (at the originating site) and the provider (at the distant site) was utilized to provide this telehealth service. Verbal consent was requested and obtained from Yarelis Lew on this date 08/22/24 for a telehealth visit.    Pt is meeting with therapist as a part of Personalized Care.    Mental Status Exam  Oriented: Person, Place, and Time  Appearance: well groomed, appropriate eye contact  Affect/Mood: dysphoric, constricted but reactive and anxious  Memory:  intact  Speech:  Normal rate, volume, prosody  Thought:  Abstract reasoning appropriate to age  Judgment: Appropriate to age  Insight: intact  Attitude/Behavior: cooperative    Risk Assessment:  Imminent Risk of Suicide or Serious Self-Injury: None, denied  Imminent Risk of Violence or Homicide: None, denied    Progress Note:  Pt stated that she's been feeling tired of waiting for others to get themselves together. She continues to struggle with how others treat her. Pt reported that she is frustrated with the lack of support and is feeling exhausted. [facility] validated Pt feelings.    Treatment Goal: Reduce anxiety to improve medical outcomes.     Treatment modality/frequency: weekly individual therapy    Start Time: 1:05 pm  End Time: 1:59 pm  CPT Code: 39644    Attestation Statements   Number of minutes spent performing psychotherapy: 54

## 2024-08-27 ENCOUNTER — HOSPITAL ENCOUNTER (OUTPATIENT)
Dept: NEUROLOGY | Facility: CLINIC | Age: 54
Discharge: HOME | End: 2024-08-27
Payer: MEDICAID

## 2024-08-27 DIAGNOSIS — M54.16 LUMBAR RADICULOPATHY: ICD-10-CM

## 2024-08-27 DIAGNOSIS — Z98.1 STATUS POST LUMBAR SPINAL FUSION: ICD-10-CM

## 2024-08-27 PROCEDURE — 95909 NRV CNDJ TST 5-6 STUDIES: CPT | Performed by: STUDENT IN AN ORGANIZED HEALTH CARE EDUCATION/TRAINING PROGRAM

## 2024-08-27 PROCEDURE — 95886 MUSC TEST DONE W/N TEST COMP: CPT | Performed by: STUDENT IN AN ORGANIZED HEALTH CARE EDUCATION/TRAINING PROGRAM

## 2024-08-28 ENCOUNTER — PHARMACY VISIT (OUTPATIENT)
Dept: PHARMACY | Facility: CLINIC | Age: 54
End: 2024-08-28
Payer: MEDICAID

## 2024-08-28 DIAGNOSIS — S32.000G COMPRESSION FRACTURE OF LUMBAR VERTEBRA WITH DELAYED HEALING, UNSPECIFIED LUMBAR VERTEBRAL LEVEL, SUBSEQUENT ENCOUNTER: ICD-10-CM

## 2024-08-28 DIAGNOSIS — G62.9 NEUROPATHY: ICD-10-CM

## 2024-08-28 DIAGNOSIS — S22.080G CLOSED WEDGE COMPRESSION FRACTURE OF T12 VERTEBRA WITH DELAYED HEALING, SUBSEQUENT ENCOUNTER: ICD-10-CM

## 2024-08-28 PROCEDURE — RXMED WILLOW AMBULATORY MEDICATION CHARGE

## 2024-08-29 ENCOUNTER — APPOINTMENT (OUTPATIENT)
Dept: BEHAVIORAL HEALTH | Facility: CLINIC | Age: 54
End: 2024-08-29
Payer: MEDICAID

## 2024-08-29 DIAGNOSIS — F43.10 PTSD (POST-TRAUMATIC STRESS DISORDER): ICD-10-CM

## 2024-08-29 PROCEDURE — 90837 PSYTX W PT 60 MINUTES: CPT | Performed by: COUNSELOR

## 2024-08-29 RX ORDER — GABAPENTIN 300 MG/1
300 CAPSULE ORAL 2 TIMES DAILY
Qty: 60 CAPSULE | Refills: 11 | OUTPATIENT
Start: 2024-08-29 | End: 2025-08-29

## 2024-08-29 RX ORDER — CYCLOBENZAPRINE HCL 10 MG
10 TABLET ORAL 3 TIMES DAILY PRN
Qty: 90 TABLET | Refills: 11 | OUTPATIENT
Start: 2024-08-29 | End: 2025-08-29

## 2024-08-29 NOTE — PROGRESS NOTES
All Individuals Present: Patient and Provider (Encounter Provider)     ID: Yarelis Lew is a 54 y.o. female      Pt met with [facility] for follow-up visit for symptoms of PTSD.    An interactive audio and video telecommunication system which permits real time communications between the patient (at the originating site) and the provider (at the distant site) was utilized to provide this telehealth service. Verbal consent was requested and obtained from Yarelis Lew on this date 08/29/24 for a telehealth visit.    Pt is meeting with therapist as a part of Personalized Care.    Mental Status Exam  Oriented: Person, Place, and Time  Appearance: well groomed, appropriate eye contact  Affect/Mood: anxious and congruent with mood and topic of conversation  Memory:  intact  Speech:  Normal rate, volume, prosody  Thought:  Abstract reasoning appropriate to age  Judgment: Appropriate to age  Insight: intact  Attitude/Behavior: cooperative    Risk Assessment:  Imminent Risk of Suicide or Serious Self-Injury: None, denied  Imminent Risk of Violence or Homicide: None, denied    Progress Note:  Pt stated that she has been in a lot of pain recently across her shoulder blades. She stated that she is having difficulty getting comfortable because her body hurts. She noted that she has been tensing her jaw a lot recently, which may be impacting her shoulders and neck. Pt shared that she has been feeling tired of dealing with the negativity of others. She is working on being more comfortable on her own. [facility] encouraged Pt to find rosaura on her own to help her keep moving toward independence.    Treatment Goal: Reduce anxiety to improve medical outcomes.     Treatment modality/frequency: weekly individual therapy    Start Time: 1:04 pm  End Time: 2:00 pm  CPT Code: 73561    Attestation Statements   Number of minutes spent performing psychotherapy: 56

## 2024-09-05 ENCOUNTER — APPOINTMENT (OUTPATIENT)
Dept: BEHAVIORAL HEALTH | Facility: CLINIC | Age: 54
End: 2024-09-05
Payer: MEDICAID

## 2024-09-12 ENCOUNTER — APPOINTMENT (OUTPATIENT)
Dept: BEHAVIORAL HEALTH | Facility: CLINIC | Age: 54
End: 2024-09-12
Payer: MEDICAID

## 2024-09-12 ENCOUNTER — DOCUMENTATION (OUTPATIENT)
Dept: BEHAVIORAL HEALTH | Facility: CLINIC | Age: 54
End: 2024-09-12

## 2024-09-16 ENCOUNTER — DOCUMENTATION (OUTPATIENT)
Dept: BEHAVIORAL HEALTH | Facility: CLINIC | Age: 54
End: 2024-09-16
Payer: MEDICAID

## 2024-09-19 ENCOUNTER — APPOINTMENT (OUTPATIENT)
Dept: BEHAVIORAL HEALTH | Facility: CLINIC | Age: 54
End: 2024-09-19
Payer: MEDICAID

## 2024-09-19 DIAGNOSIS — F43.10 PTSD (POST-TRAUMATIC STRESS DISORDER): ICD-10-CM

## 2024-09-19 PROCEDURE — 90837 PSYTX W PT 60 MINUTES: CPT | Performed by: COUNSELOR

## 2024-09-19 NOTE — PROGRESS NOTES
All Individuals Present: Patient and Provider (Encounter Provider)     ID: Yarelis Lew is a 54 y.o. female      Pt met with [facility] for follow-up visit for symptoms of PTSD.    An interactive audio and video telecommunication system which permits real time communications between the patient (at the originating site) and the provider (at the distant site) was utilized to provide this telehealth service. Verbal consent was requested and obtained from Yarelis Lew on this date 09/19/24 for a telehealth visit.    Pt is meeting with therapist as a part of Personalized Care.    Mental Status Exam  Oriented: Person, Place, and Time  Appearance: well groomed, appropriate eye contact  Affect/Mood: anxious  Memory:  intact  Speech:  Normal rate, volume, prosody  Thought:  Abstract reasoning appropriate to age  Judgment: Appropriate to age  Insight: intact  Attitude/Behavior: cooperative    Risk Assessment:  Imminent Risk of Suicide or Serious Self-Injury: passive suicidal thoughts and does not want to die  Imminent Risk of Violence or Homicide: None, denied    Progress Note:  Pt shared that she is having to deal with several issues at her home, including the roof of her porch which is falling down and electric shorts in the garage. She reported that she has been struggling with feelings of low self-worth. Pt stated that she does have some passive thoughts that it would be easier to not be around, but she does identify future-orientation and does not appear to be in danger at this time. [facility] discussed ways that Pt can challenge her thoughts and work towards building a life that better-aligns with what she enjoys. Pt agreed that she can maintain her safety. Pt completed the PEN 13 assessment with the [facility].    Treatment Goal: Reduce anxiety to improve medical outcomes.     Treatment modality/frequency: weekly individual therapy    Start Time: 1:06 pm  End Time: 2:05 pm  CPT Code: 83416    Attestation Statements   Number of  minutes spent performing psychotherapy: 59

## 2024-09-20 PROCEDURE — RXMED WILLOW AMBULATORY MEDICATION CHARGE

## 2024-09-21 ENCOUNTER — PHARMACY VISIT (OUTPATIENT)
Dept: PHARMACY | Facility: CLINIC | Age: 54
End: 2024-09-21
Payer: MEDICAID

## 2024-09-25 ENCOUNTER — APPOINTMENT (OUTPATIENT)
Dept: PRIMARY CARE | Facility: CLINIC | Age: 54
End: 2024-09-25
Payer: MEDICAID

## 2024-09-25 VITALS
BODY MASS INDEX: 20.25 KG/M2 | RESPIRATION RATE: 16 BRPM | WEIGHT: 129 LBS | HEIGHT: 67 IN | SYSTOLIC BLOOD PRESSURE: 138 MMHG | HEART RATE: 83 BPM | DIASTOLIC BLOOD PRESSURE: 83 MMHG | TEMPERATURE: 96.8 F | OXYGEN SATURATION: 97 %

## 2024-09-25 DIAGNOSIS — G89.29 CHRONIC RIGHT SHOULDER PAIN: ICD-10-CM

## 2024-09-25 DIAGNOSIS — G62.9 NEUROPATHY: ICD-10-CM

## 2024-09-25 DIAGNOSIS — S32.000G COMPRESSION FRACTURE OF LUMBAR VERTEBRA WITH DELAYED HEALING, UNSPECIFIED LUMBAR VERTEBRAL LEVEL, SUBSEQUENT ENCOUNTER: ICD-10-CM

## 2024-09-25 DIAGNOSIS — G40.909 NONINTRACTABLE EPILEPSY WITHOUT STATUS EPILEPTICUS, UNSPECIFIED EPILEPSY TYPE (MULTI): ICD-10-CM

## 2024-09-25 DIAGNOSIS — F43.10 PTSD (POST-TRAUMATIC STRESS DISORDER): ICD-10-CM

## 2024-09-25 DIAGNOSIS — S22.080G CLOSED WEDGE COMPRESSION FRACTURE OF T12 VERTEBRA WITH DELAYED HEALING, SUBSEQUENT ENCOUNTER: ICD-10-CM

## 2024-09-25 DIAGNOSIS — M25.511 CHRONIC RIGHT SHOULDER PAIN: ICD-10-CM

## 2024-09-25 DIAGNOSIS — S32.000S COMPRESSION FRACTURE OF LUMBAR VERTEBRA, UNSPECIFIED LUMBAR VERTEBRAL LEVEL, SEQUELA: Primary | ICD-10-CM

## 2024-09-25 PROCEDURE — 1036F TOBACCO NON-USER: CPT | Performed by: FAMILY MEDICINE

## 2024-09-25 PROCEDURE — 3008F BODY MASS INDEX DOCD: CPT | Performed by: FAMILY MEDICINE

## 2024-09-25 PROCEDURE — 99214 OFFICE O/P EST MOD 30 MIN: CPT | Performed by: FAMILY MEDICINE

## 2024-09-25 PROCEDURE — RXMED WILLOW AMBULATORY MEDICATION CHARGE

## 2024-09-25 RX ORDER — TOPIRAMATE 25 MG/1
25 TABLET ORAL 2 TIMES DAILY
Qty: 60 TABLET | Refills: 1 | Status: SHIPPED | OUTPATIENT
Start: 2024-09-25 | End: 2024-11-24

## 2024-09-25 RX ORDER — DULOXETIN HYDROCHLORIDE 20 MG/1
20 CAPSULE, DELAYED RELEASE ORAL DAILY
Qty: 30 CAPSULE | Refills: 1 | Status: SHIPPED | OUTPATIENT
Start: 2024-09-25 | End: 2024-11-24

## 2024-09-25 RX ORDER — GABAPENTIN 300 MG/1
300 CAPSULE ORAL 2 TIMES DAILY
Qty: 14 CAPSULE | Refills: 0 | Status: SHIPPED | OUTPATIENT
Start: 2024-09-25 | End: 2024-10-04

## 2024-09-25 RX ORDER — CYCLOBENZAPRINE HCL 10 MG
10 TABLET ORAL 3 TIMES DAILY PRN
Qty: 90 TABLET | Refills: 11 | Status: SHIPPED | OUTPATIENT
Start: 2024-09-25 | End: 2025-09-25

## 2024-09-25 ASSESSMENT — PAIN SCALES - GENERAL: PAINLEVEL: 4

## 2024-09-25 ASSESSMENT — PATIENT HEALTH QUESTIONNAIRE - PHQ9
5. POOR APPETITE OR OVEREATING: MORE THAN HALF THE DAYS
SUM OF ALL RESPONSES TO PHQ QUESTIONS 1-9: 19
7. TROUBLE CONCENTRATING ON THINGS, SUCH AS READING THE NEWSPAPER OR WATCHING TELEVISION: MORE THAN HALF THE DAYS
6. FEELING BAD ABOUT YOURSELF - OR THAT YOU ARE A FAILURE OR HAVE LET YOURSELF OR YOUR FAMILY DOWN: NEARLY EVERY DAY
2. FEELING DOWN, DEPRESSED OR HOPELESS: NEARLY EVERY DAY
8. MOVING OR SPEAKING SO SLOWLY THAT OTHER PEOPLE COULD HAVE NOTICED. OR THE OPPOSITE, BEING SO FIGETY OR RESTLESS THAT YOU HAVE BEEN MOVING AROUND A LOT MORE THAN USUAL: NOT AT ALL
1. LITTLE INTEREST OR PLEASURE IN DOING THINGS: MORE THAN HALF THE DAYS
10. IF YOU CHECKED OFF ANY PROBLEMS, HOW DIFFICULT HAVE THESE PROBLEMS MADE IT FOR YOU TO DO YOUR WORK, TAKE CARE OF THINGS AT HOME, OR GET ALONG WITH OTHER PEOPLE: EXTREMELY DIFFICULT
4. FEELING TIRED OR HAVING LITTLE ENERGY: MORE THAN HALF THE DAYS
3. TROUBLE FALLING OR STAYING ASLEEP OR SLEEPING TOO MUCH: NEARLY EVERY DAY
9. THOUGHTS THAT YOU WOULD BE BETTER OFF DEAD, OR OF HURTING YOURSELF: MORE THAN HALF THE DAYS
SUM OF ALL RESPONSES TO PHQ9 QUESTIONS 1 AND 2: 5

## 2024-09-25 ASSESSMENT — ANXIETY QUESTIONNAIRES
3. WORRYING TOO MUCH ABOUT DIFFERENT THINGS: NEARLY EVERY DAY
IF YOU CHECKED OFF ANY PROBLEMS ON THIS QUESTIONNAIRE, HOW DIFFICULT HAVE THESE PROBLEMS MADE IT FOR YOU TO DO YOUR WORK, TAKE CARE OF THINGS AT HOME, OR GET ALONG WITH OTHER PEOPLE: EXTREMELY DIFFICULT
5. BEING SO RESTLESS THAT IT IS HARD TO SIT STILL: MORE THAN HALF THE DAYS
GAD7 TOTAL SCORE: 17
1. FEELING NERVOUS, ANXIOUS, OR ON EDGE: NEARLY EVERY DAY
6. BECOMING EASILY ANNOYED OR IRRITABLE: NOT AT ALL
4. TROUBLE RELAXING: NEARLY EVERY DAY
2. NOT BEING ABLE TO STOP OR CONTROL WORRYING: NEARLY EVERY DAY
7. FEELING AFRAID AS IF SOMETHING AWFUL MIGHT HAPPEN: NEARLY EVERY DAY

## 2024-09-25 NOTE — PROGRESS NOTES
"Subjective   Patient ID: Yarelis Lew is a 54 y.o. female who presents for re est.    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      Objective   /83   Pulse 83   Temp 36 °C (96.8 °F)   Resp 16   Ht 1.702 m (5' 7\")   Wt 58.5 kg (129 lb)   SpO2 97%   BMI 20.20 kg/m²     Physical Exam  Constitutional:       Appearance: Normal appearance.   HENT:      Head: Normocephalic and atraumatic.      Right Ear: Tympanic membrane, ear canal and external ear normal.      Left Ear: Tympanic membrane, ear canal and external ear normal.      Nose: Nose normal.      Mouth/Throat:      Mouth: Mucous membranes are moist.      Pharynx: Oropharynx is clear.   Eyes:      Extraocular Movements: Extraocular movements intact.      Conjunctiva/sclera: Conjunctivae normal.      Pupils: Pupils are equal, round, and reactive to light.   Cardiovascular:      Rate and Rhythm: Normal rate and regular rhythm.      Pulses: Normal pulses.      Heart sounds: Normal heart sounds.   Pulmonary:      Effort: Pulmonary effort is normal.      Breath sounds: Normal breath sounds.   Abdominal:      General: Abdomen is flat. Bowel sounds are normal.      Palpations: Abdomen is soft.   Genitourinary:     Comments: nl  Musculoskeletal:         General: Swelling and tenderness present.      Cervical back: Normal range of motion and neck supple.   Skin:     General: Skin is warm and dry.      Capillary Refill: Capillary refill takes less than 2 seconds.   Neurological:      General: No focal deficit present.      Mental Status: She is alert and oriented to person, place, and time.   Psychiatric:         Thought Content: Thought content " normal.      Comments: Flat depressed         Assessment/Plan   Diagnoses and all orders for this visit:  Compression fracture of lumbar vertebra, unspecified lumbar vertebral level, sequela  Closed wedge compression fracture of T12 vertebra with delayed healing, subsequent encounter  -     cyclobenzaprine (Flexeril) 10 mg tablet; TAKE 1 TABLET BY MOUTH THREE TIMES A DAY AS NEEDED FOR MUSCLE SPASMS  -     gabapentin (Neurontin) 300 mg capsule; TAKE 1 CAPSULE BY MOUTH TWO TIMES A DAY  -     DULoxetine (Cymbalta) 20 mg DR capsule; Take 1 capsule (20 mg) by mouth once daily. Do not crush or chew.  -     Referral to Pain Medicine; Future  Compression fracture of lumbar vertebra with delayed healing, unspecified lumbar vertebral level, subsequent encounter  -     cyclobenzaprine (Flexeril) 10 mg tablet; TAKE 1 TABLET BY MOUTH THREE TIMES A DAY AS NEEDED FOR MUSCLE SPASMS  -     gabapentin (Neurontin) 300 mg capsule; TAKE 1 CAPSULE BY MOUTH TWO TIMES A DAY  -     TSH with reflex to Free T4 if abnormal; Future  -     Lipid Panel; Future  -     Comprehensive Metabolic Panel; Future  -     CBC and Auto Differential; Future  Neuropathy  -     cyclobenzaprine (Flexeril) 10 mg tablet; TAKE 1 TABLET BY MOUTH THREE TIMES A DAY AS NEEDED FOR MUSCLE SPASMS  -     gabapentin (Neurontin) 300 mg capsule; TAKE 1 CAPSULE BY MOUTH TWO TIMES A DAY  -     TSH with reflex to Free T4 if abnormal; Future  -     Lipid Panel; Future  -     Comprehensive Metabolic Panel; Future  -     CBC and Auto Differential; Future  PTSD (post-traumatic stress disorder)  -     topiramate (Topamax) 25 mg tablet; Take 1 tablet (25 mg) by mouth 2 times a day.  -     TSH with reflex to Free T4 if abnormal; Future  -     Lipid Panel; Future  -     Comprehensive Metabolic Panel; Future  -     CBC and Auto Differential; Future  Nonintractable epilepsy without status epilepticus, unspecified epilepsy type (Multi)  Chronic right shoulder pain  -     XR shoulder right  2+ views; Future    Advised pt I am leaving the practice and will not be accepting her ins  When appt was made months ago, I was planning on taking her on but at this time  Have advised her to find an internist  Referred to pain management they can assume gabapentin  I have also filled cymbalta

## 2024-09-26 ENCOUNTER — APPOINTMENT (OUTPATIENT)
Dept: BEHAVIORAL HEALTH | Facility: CLINIC | Age: 54
End: 2024-09-26
Payer: MEDICAID

## 2024-09-26 DIAGNOSIS — F43.10 PTSD (POST-TRAUMATIC STRESS DISORDER): ICD-10-CM

## 2024-09-26 PROCEDURE — 90837 PSYTX W PT 60 MINUTES: CPT | Performed by: COUNSELOR

## 2024-09-26 NOTE — PROGRESS NOTES
All Individuals Present: Patient and Provider (Encounter Provider)     ID: Yarelis Lew is a 54 y.o. female      Pt met with [facility] for follow-up visit for symptoms of PTSD.    An interactive audio and video telecommunication system which permits real time communications between the patient (at the originating site) and the provider (at the distant site) was utilized to provide this telehealth service. Verbal consent was requested and obtained from Yarelis Lew on this date 09/26/24 for a telehealth visit.    Pt is meeting with therapist as a part of Personalized Care.    Mental Status Exam  Oriented: Person, Place, and Time  Appearance: well groomed, appropriate eye contact  Affect/Mood: anxious  Memory:  intact  Speech:  Normal rate, volume, prosody  Thought:  Abstract reasoning appropriate to age  Judgment: Appropriate to age  Insight: intact  Attitude/Behavior: cooperative    Risk Assessment:  Imminent Risk of Suicide or Serious Self-Injury: None, denied  Imminent Risk of Violence or Homicide: None, denied    Progress Note:  Pt shared that she is feeling positive because she was able to see her daughter this week when she came to visit. Pt was glad to see her daughter and noted that she would like to see her more, but she does not want to cross her daughter's boundary. Pt discussed concerns about long-term finances. She reported that she is working on figuring out how to cover bills.    Treatment Goal: Reduce anxiety to improve medical outcomes.     Treatment modality/frequency: weekly individual therapy    Start Time: 1:02 pm  End Time: 2:00 pm  CPT Code: 35854    Attestation Statements   Number of minutes spent performing psychotherapy: 58

## 2024-09-27 ENCOUNTER — PHARMACY VISIT (OUTPATIENT)
Dept: PHARMACY | Facility: CLINIC | Age: 54
End: 2024-09-27
Payer: MEDICAID

## 2024-10-03 ENCOUNTER — APPOINTMENT (OUTPATIENT)
Dept: BEHAVIORAL HEALTH | Facility: CLINIC | Age: 54
End: 2024-10-03
Payer: MEDICAID

## 2024-10-03 DIAGNOSIS — F43.10 PTSD (POST-TRAUMATIC STRESS DISORDER): ICD-10-CM

## 2024-10-03 PROCEDURE — 90834 PSYTX W PT 45 MINUTES: CPT | Performed by: COUNSELOR

## 2024-10-03 NOTE — PROGRESS NOTES
All Individuals Present: Patient and Provider (Encounter Provider)     ID: Yarelis Lew is a 54 y.o. female      Pt met with [facility] for follow-up visit for symptoms of PTSD.    An interactive audio and video telecommunication system which permits real time communications between the patient (at the originating site) and the provider (at the distant site) was utilized to provide this telehealth service. Verbal consent was requested and obtained from Yarelis Lew on this date 10/03/24 for a telehealth visit.    Pt is meeting with therapist as a part of Personalized Care.    Mental Status Exam  Oriented: Person, Place, and Time  Appearance: well groomed, appropriate eye contact  Affect/Mood: anxious and congruent with mood and topic of conversation  Memory:  intact  Speech:  Normal rate, volume, prosody  Thought:  Abstract reasoning appropriate to age  Judgment: Appropriate to age  Insight: intact  Attitude/Behavior: cooperative    Risk Assessment:  Imminent Risk of Suicide or Serious Self-Injury: None, denied  Imminent Risk of Violence or Homicide: None, denied    Progress Note:  Pt shared that her nephew is coming over in the next few weeks to help her determine if she could modify her current home to make it more accessible or if she might need to move. She is anxious in that she knows she has to get rid of some things, but she is also uncertain if she can stay in her home for the future because of the upkeep. Pt was able to express her concerns.    Treatment Goal: Reduce anxiety to improve medical outcomes.     Treatment modality/frequency: weekly individual therapy    Start Time: 1:07 pm  End Time: 1:59 pm  CPT Code: 86404    Attestation Statements   Number of minutes spent performing psychotherapy: 52

## 2024-10-10 ENCOUNTER — APPOINTMENT (OUTPATIENT)
Dept: BEHAVIORAL HEALTH | Facility: CLINIC | Age: 54
End: 2024-10-10
Payer: MEDICAID

## 2024-10-10 DIAGNOSIS — F43.10 PTSD (POST-TRAUMATIC STRESS DISORDER): ICD-10-CM

## 2024-10-10 PROCEDURE — 90834 PSYTX W PT 45 MINUTES: CPT | Performed by: COUNSELOR

## 2024-10-10 NOTE — PROGRESS NOTES
All Individuals Present: Patient and Provider (Encounter Provider)     ID: Yarelis Lew is a 54 y.o. female      Pt met with [facility] for follow-up visit for symptoms of PTSD.    An interactive audio and video telecommunication system which permits real time communications between the patient (at the originating site) and the provider (at the distant site) was utilized to provide this telehealth service. Verbal consent was requested and obtained from Yarelis Lew on this date 10/10/24 for a telehealth visit.    Pt is meeting with therapist as a part of Personalized Care.    Mental Status Exam  Oriented: Person, Place, and Time  Appearance: well groomed, appropriate eye contact  Affect/Mood: anxious and congruent with mood and topic of conversation  Memory:  intact  Speech:  Normal rate, volume, prosody  Thought:  Abstract reasoning appropriate to age  Judgment: Appropriate to age  Insight: intact  Attitude/Behavior: cooperative    Risk Assessment:  Imminent Risk of Suicide or Serious Self-Injury: None, denied  Imminent Risk of Violence or Homicide: None, denied    Progress Note:  Pt shared that she continues to struggle in her relationship with her friend. She stated that they can start a discussion easily, but he will quickly blow things out of proportion, which causes her great stress. Pt reported that he will often accuse her of being a negative person. [facility] noted that Pt is not negative about everything, but is actually willing to find the positive in things.     Treatment Goal: Reduce anxiety to improve medical outcomes.     Treatment modality/frequency: weekly individual therapy    Start Time: 1:06 pm  End Time: 1:54 pm  CPT Code: 17848    Attestation Statements   Number of minutes spent performing psychotherapy: 48

## 2024-10-17 ENCOUNTER — OFFICE VISIT (OUTPATIENT)
Dept: PAIN MEDICINE | Facility: HOSPITAL | Age: 54
End: 2024-10-17
Payer: MEDICAID

## 2024-10-17 DIAGNOSIS — M54.12 CERVICAL RADICULOPATHY: ICD-10-CM

## 2024-10-17 DIAGNOSIS — S22.080G CLOSED WEDGE COMPRESSION FRACTURE OF T12 VERTEBRA WITH DELAYED HEALING, SUBSEQUENT ENCOUNTER: ICD-10-CM

## 2024-10-17 PROCEDURE — 99204 OFFICE O/P NEW MOD 45 MIN: CPT | Performed by: ANESTHESIOLOGY

## 2024-10-17 PROCEDURE — 99214 OFFICE O/P EST MOD 30 MIN: CPT | Performed by: ANESTHESIOLOGY

## 2024-10-17 ASSESSMENT — PAIN SCALES - GENERAL: PAINLEVEL_OUTOF10: 8

## 2024-10-18 ENCOUNTER — APPOINTMENT (OUTPATIENT)
Dept: BEHAVIORAL HEALTH | Facility: CLINIC | Age: 54
End: 2024-10-18
Payer: MEDICAID

## 2024-10-18 DIAGNOSIS — F43.10 PTSD (POST-TRAUMATIC STRESS DISORDER): ICD-10-CM

## 2024-10-18 PROCEDURE — 90837 PSYTX W PT 60 MINUTES: CPT | Performed by: COUNSELOR

## 2024-10-18 NOTE — PROGRESS NOTES
All Individuals Present: Patient and Provider (Encounter Provider)     ID: Yarelis Lew is a 54 y.o. female      Pt met with [facility] for follow-up visit for symptoms of PTSD.    An interactive audio and video telecommunication system which permits real time communications between the patient (at the originating site) and the provider (at the distant site) was utilized to provide this telehealth service. Verbal consent was requested and obtained from Yarelis Lew on this date 10/18/24 for a telehealth visit.    Pt is meeting with therapist as a part of Personalized Care.    Mental Status Exam  Oriented: Person, Place, and Time  Appearance: well groomed, appropriate eye contact  Affect/Mood: sad/tearful, anxious, and incongruent with mood  Memory:  intact  Speech:  Normal rate, volume, prosody  Thought:  Abstract reasoning appropriate to age  Judgment: Appropriate to age  Insight: intact  Attitude/Behavior: cooperative    Risk Assessment:  Imminent Risk of Suicide or Serious Self-Injury: None, denied  Imminent Risk of Violence or Homicide: None, denied    Progress Note:  Pt shared that she saw the pain management doctor yesterday and they discussed a pain pump. She stated that at first she thought it was a good thing, but she is now she is feeling scared that this means that she is dying. [facility] challenged the Pt to think about alternative reasons that the doctor may have suggested the pain pump. Pt shared that she is concerned about finances and is considering what she can do to manage her income.    Treatment Goal: Reduce anxiety to improve medical outcomes.     Treatment modality/frequency: weekly individual therapy    Start Time:1:03 pm  End Time: 1:59 pm  CPT Code: 92187    Attestation Statements   Number of minutes spent performing psychotherapy: 56

## 2024-10-20 PROCEDURE — RXMED WILLOW AMBULATORY MEDICATION CHARGE

## 2024-10-22 ENCOUNTER — PHARMACY VISIT (OUTPATIENT)
Dept: PHARMACY | Facility: CLINIC | Age: 54
End: 2024-10-22
Payer: MEDICAID

## 2024-10-24 ENCOUNTER — APPOINTMENT (OUTPATIENT)
Dept: BEHAVIORAL HEALTH | Facility: CLINIC | Age: 54
End: 2024-10-24
Payer: MEDICAID

## 2024-10-24 ENCOUNTER — TELEPHONE (OUTPATIENT)
Dept: BEHAVIORAL HEALTH | Facility: CLINIC | Age: 54
End: 2024-10-24

## 2024-10-24 NOTE — PROGRESS NOTES
Therapist called Pt to check in on her due to not attending scheduled appointment. Pt voicemail is full. Therapist call back number is [phone].

## 2024-10-24 NOTE — PROGRESS NOTES
Chief Complaint   Patient presents with    Back Pain    Extremity Pain    Shoulder Pain        HPI   Yarelis douglas is a 54-year-old female with a history of back pain who presents for an initial evaluation.  The patient has had prior surgery with Dr. Kishan Schaefer with orthopedic spine surgery.  Imaging was personally reviewed which reveals a prior T5 to iliac surgery with posterior spinal fusion/instrumentation.  The patient says she feels that she has a combination of bone pain, nerve pain, and muscle pain.  She says that she was pain-free and totally normal, had no deficits in function up through 2020.  The patient says that in 2020 she fractured her T12 vertebrae when she passed out.  She did see pain management at that time, Dr. Butler.  The patient said that the experience with pain management was overall very negative and she was treated poorly by her report.  She avoided pain management but in 2023, April underwent a 10-hour surgery with Dr. Schaefer.  She unfortunately had infection in her spine and had 2 further surgeries.  The patient says that she has continued to struggle with right lower extremity weakness and bilateral anterior leg numbness.  She has had new complaints of heaviness and weakness in her arms which makes it difficult for her to function, notes balance disturbance which is new, and notes dropping things.  She has done prior formal physical therapy for these issue and keeps up with physician directed exercises on a daily basis for her whole spine which she previously learned at formal physical therapy.  She notes that she has been doing exercises since her surgery as well as daily stretches.    The patient did have an EMG done by neurology, Dr. Wen which was completed on 8/30/2024.  She did have EMG evidence which was most consistent with an active and chronic right femoral mononeuropathy supported by absent saphenous sensory response and lack of motor unit changes in the thigh  adductors.  A concurrent L3-4 radiculopathy could not be entirely excluded.  Testing for both extremities was ordered but only 1 extremity was done, by documentation it was due to the patient being late for her testing.  She never pursued testing of the contralateral leg.    Medications include duloxetine 20 mg daily, gabapentin 300 mg 3 times a day, topiramate, and cyclobenzaprine.    ROS: 13 point review of systems is complete and is negative listed above in HPI    Past Medical History:   Diagnosis Date    Personal history of other mental and behavioral disorders 05/20/2014    History of anxiety disorder    Personal history of other mental and behavioral disorders 05/20/2014    History of depression    Pityriasis versicolor 10/27/2015    Tinea versicolor    Wedge compression fracture of unspecified lumbar vertebra, initial encounter for closed fracture (Multi) 01/03/2023    Lumbar compression fracture       Past Surgical History:   Procedure Laterality Date    OTHER SURGICAL HISTORY  09/14/2022    Tooth extraction    OTHER SURGICAL HISTORY  09/14/2022    Nose surgery    OTHER SURGICAL HISTORY  05/17/2019    Tubal ligation    OTHER SURGICAL HISTORY  05/17/2019    Cholecystectomy       Family History   Problem Relation Name Age of Onset    Other (Digestive problems) Mother      Coronary artery disease Mother      Lung cancer Mother      Breast cancer Sister         Social History     Tobacco Use    Smoking status: Never    Smokeless tobacco: Never   Substance Use Topics    Alcohol use: Yes    Drug use: Yes     Types: Marijuana       Current Outpatient Medications on File Prior to Visit   Medication Sig Dispense Refill    cyclobenzaprine (Flexeril) 10 mg tablet TAKE 1 TABLET BY MOUTH THREE TIMES A DAY AS NEEDED FOR MUSCLE SPASMS 90 tablet 11    dicloxacillin (Dynapen) 500 mg capsule TAKE 1 CAPSULE BY MOUTH ONCE DAILY 90 capsule 1    dicloxacillin (Dynapen) 500 mg capsule 1 capsule 2 TIMES DAILY (route: oral)       dicloxacillin (Dynapen) 500 mg capsule Take 1 capsule by mouth once daily. 180 capsule 0    dicloxacillin (Dynapen) 500 mg capsule Take 1 capsule by mouth once daily. (Patient not taking: Reported on 9/25/2024) 180 capsule 0    DULoxetine (Cymbalta) 20 mg DR capsule Take 1 capsule (20 mg) by mouth once daily. Do not crush or chew. 30 capsule 1    gabapentin (Neurontin) 300 mg capsule TAKE 1 CAPSULE BY MOUTH THREE TIMES A  capsule 1    gabapentin (Neurontin) 300 mg capsule TAKE 1 CAPSULE BY MOUTH TWO TIMES A DAY 14 capsule 0    topiramate (Topamax) 25 mg tablet Take 1 tablet (25 mg) by mouth 2 times a day. 60 tablet 1     No current facility-administered medications on file prior to visit.        Allergies   Allergen Reactions    Cefazolin Anaphylaxis     Drug fever while at [facility] for treatment of spinal infection.          Imaging:  Narrative & Impression   Interpreted By:  Darío Sin,   STUDY:  XR ENTIRE SPINE 1 VIEW; ;  10/3/2023 11:40 am      INDICATION:  Signs/Symptoms:Back pain.      COMPARISON:  07/17/2023      ACCESSION NUMBER(S):  [id number]      ORDERING CLINICIAN:  WALLACE ALMARAZ      FINDINGS:  Entire spine, AP and lateral views      Redemonstration of a posterior spinal fusion extending from the upper  thoracic spine at T5 to the iliac bones. The hardware is intact.  There is no malalignment. There is no change from the prior      IMPRESSION:  Redemonstration of extensive postsurgical changes in the  thoracolumbar spine status posterior fusion. The hardware is intact.         Physical Exam:  Gen.: Thin, no acute distress  Eyes: Pupils are symmetric  ENT: Hearing is grossly intact  Neck: No JVD noted, tracheal position is midline  Respiratory: No gasping or shortness of breath   Cardiovascular: Extremity show no edema   Skin: No rashes or open lesions or ulcers identified on the skin  Musculoskeletal: Gait is grossly normal, positive Spurling's bilaterally.  Normal reflexes in the upper  extremities, negative Carlee's bilaterally.  Patient with intact sensation in the bilateral lower extremities as well as upper extremities.  She does have some weakness with  strength bilaterally on exam.  Neurologic: Cranial nerves II through XII are grossly intact  Psychiatric:  Patient is alert and oriented x3    Impression/Plan:  54-year-old female with a history of prior thoracic to iliac instrumentation, extensive fusion.  The patient overall is doing well in the areas of her prior surgery but she has new symptoms which are concerning for something going on in the cervical area.  She has imbalance, upper extremity weakness, and a positive Spurling's.  She does not have a positive Carlee's or other findings on exam in the office today. The patient does have plain films but no advanced imaging of the cervical area since she has developed these new symptoms.  We discussed getting a cervical MRI.  Would continue medications as per PCP.  We discussed keeping up with physical therapy.  Once we have the MRI we discussed the potential for interventional procedures if indicated.

## 2024-10-29 ENCOUNTER — OFFICE VISIT (OUTPATIENT)
Dept: ORTHOPEDIC SURGERY | Facility: CLINIC | Age: 54
End: 2024-10-29
Payer: MEDICAID

## 2024-10-29 ENCOUNTER — HOSPITAL ENCOUNTER (OUTPATIENT)
Dept: RADIOLOGY | Facility: CLINIC | Age: 54
Discharge: HOME | End: 2024-10-29
Payer: MEDICAID

## 2024-10-29 VITALS — WEIGHT: 129 LBS | BODY MASS INDEX: 20.25 KG/M2 | HEIGHT: 67 IN

## 2024-10-29 DIAGNOSIS — M79.605 PAIN IN BOTH LOWER EXTREMITIES: ICD-10-CM

## 2024-10-29 DIAGNOSIS — M41.9 PAIN ASSOCIATED WITH SCOLIOSIS: ICD-10-CM

## 2024-10-29 DIAGNOSIS — M79.604 PAIN IN BOTH LOWER EXTREMITIES: ICD-10-CM

## 2024-10-29 PROCEDURE — 72082 X-RAY EXAM ENTIRE SPI 2/3 VW: CPT | Performed by: RADIOLOGY

## 2024-10-29 PROCEDURE — 99214 OFFICE O/P EST MOD 30 MIN: CPT | Performed by: ORTHOPAEDIC SURGERY

## 2024-10-29 PROCEDURE — 72082 X-RAY EXAM ENTIRE SPI 2/3 VW: CPT

## 2024-10-29 PROCEDURE — 3008F BODY MASS INDEX DOCD: CPT | Performed by: ORTHOPAEDIC SURGERY

## 2024-10-31 ENCOUNTER — APPOINTMENT (OUTPATIENT)
Dept: BEHAVIORAL HEALTH | Facility: CLINIC | Age: 54
End: 2024-10-31
Payer: MEDICAID

## 2024-10-31 DIAGNOSIS — F43.10 PTSD (POST-TRAUMATIC STRESS DISORDER): ICD-10-CM

## 2024-10-31 PROCEDURE — 90832 PSYTX W PT 30 MINUTES: CPT | Performed by: COUNSELOR

## 2024-11-01 ENCOUNTER — HOSPITAL ENCOUNTER (OUTPATIENT)
Dept: RADIOLOGY | Facility: HOSPITAL | Age: 54
Discharge: HOME | End: 2024-11-01
Payer: MEDICAID

## 2024-11-01 DIAGNOSIS — M54.12 CERVICAL RADICULOPATHY: ICD-10-CM

## 2024-11-01 PROCEDURE — 72141 MRI NECK SPINE W/O DYE: CPT

## 2024-11-07 ENCOUNTER — APPOINTMENT (OUTPATIENT)
Dept: BEHAVIORAL HEALTH | Facility: CLINIC | Age: 54
End: 2024-11-07
Payer: MEDICAID

## 2024-11-07 DIAGNOSIS — F43.10 PTSD (POST-TRAUMATIC STRESS DISORDER): ICD-10-CM

## 2024-11-07 PROCEDURE — 90837 PSYTX W PT 60 MINUTES: CPT | Performed by: COUNSELOR

## 2024-11-07 NOTE — PROGRESS NOTES
All Individuals Present: Patient and Provider (Encounter Provider)     ID: Yarelis Lew is a 54 y.o. female      Pt met with [facility] for follow-up visit for symptoms of PTSD.    An interactive audio and video telecommunication system which permits real time communications between the patient (at the originating site) and the provider (at the distant site) was utilized to provide this telehealth service. Verbal consent was requested and obtained from Yarelis Lew on this date 11/07/24 for a telehealth visit.    Pt is meeting with therapist as a part of Personalized Care.    Mental Status Exam  Oriented: Person, Place, and Time  Appearance: well groomed, appropriate eye contact  Affect/Mood: anxious  Memory:  intact  Speech:  Normal rate, volume, prosody  Thought:  Abstract reasoning appropriate to age  Judgment: Appropriate to age  Insight: intact  Attitude/Behavior: cooperative    Risk Assessment:  Imminent Risk of Suicide or Serious Self-Injury: None, denied  Imminent Risk of Violence or Homicide: None, denied    Progress Note:  Pt shared that she is feeling tired recently as the temperature has been getting cooler. She noted that she continues to feel some pain in her back and is feeling tired of experiencing pain. Pt discussed past relationships and how those have impacted her daily functioning.     Treatment Goal: Reduce anxiety to improve medical outcomes.     Treatment modality/frequency: weekly individual therapy    Start Time: 1:01 pm  End Time: 1:59 pm  CPT Code: 65566    Attestation Statements   Number of minutes spent performing psychotherapy: 58

## 2024-11-14 ENCOUNTER — APPOINTMENT (OUTPATIENT)
Dept: BEHAVIORAL HEALTH | Facility: CLINIC | Age: 54
End: 2024-11-14
Payer: MEDICAID

## 2024-11-14 DIAGNOSIS — F43.10 PTSD (POST-TRAUMATIC STRESS DISORDER): ICD-10-CM

## 2024-11-14 PROCEDURE — 90834 PSYTX W PT 45 MINUTES: CPT | Performed by: COUNSELOR

## 2024-11-14 NOTE — PROGRESS NOTES
All Individuals Present: Patient and Provider (Encounter Provider)     ID: Yarelis Lew is a 54 y.o. female      Pt met with [facility] for follow-up visit for symptoms of PTSD.    An interactive audio and video telecommunication system which permits real time communications between the patient (at the originating site) and the provider (at the distant site) was utilized to provide this telehealth service. Verbal consent was requested and obtained from Yarelis Lew on this date 11/14/24 for a telehealth visit.    Pt is meeting with therapist as a part of Personalized Care.    Mental Status Exam  Oriented: Person, Place, and Time  Appearance: well groomed, appropriate eye contact  Affect/Mood: congruent with mood and topic of conversation  Memory:  intact  Speech:  Normal rate, volume, prosody  Thought:  Abstract reasoning appropriate to age  Judgment: Appropriate to age  Insight: intact  Attitude/Behavior: cooperative    Risk Assessment:  Imminent Risk of Suicide or Serious Self-Injury: None, denied  Imminent Risk of Violence or Homicide: None, denied    Progress Note:  Pt shared that she has been painting in the kitchen because she feels like it is something she can have control over in a situation that feels uncontrollable. She noted that she recognizes that she may be keeping herself active as a distraction, but it helps to keep her from focusing on things she cannot control. Pt discussed difficulties she may face in the upcoming colder months.    Treatment Goal: Reduce anxiety to improve medical outcomes.     Treatment modality/frequency: weekly individual therapy    Start Time: 1:08 pm  End Time: 1:59 pm  CPT Code: 08656    Attestation Statements   Number of minutes spent performing psychotherapy: 51

## 2024-11-19 PROCEDURE — RXMED WILLOW AMBULATORY MEDICATION CHARGE

## 2024-11-21 ENCOUNTER — APPOINTMENT (OUTPATIENT)
Dept: BEHAVIORAL HEALTH | Facility: CLINIC | Age: 54
End: 2024-11-21
Payer: MEDICAID

## 2024-11-21 ENCOUNTER — PHARMACY VISIT (OUTPATIENT)
Dept: PHARMACY | Facility: CLINIC | Age: 54
End: 2024-11-21
Payer: MEDICAID

## 2024-12-05 ENCOUNTER — APPOINTMENT (OUTPATIENT)
Dept: BEHAVIORAL HEALTH | Facility: CLINIC | Age: 54
End: 2024-12-05
Payer: MEDICAID

## 2024-12-05 DIAGNOSIS — F43.10 PTSD (POST-TRAUMATIC STRESS DISORDER): ICD-10-CM

## 2024-12-05 PROCEDURE — 90834 PSYTX W PT 45 MINUTES: CPT | Performed by: COUNSELOR

## 2024-12-05 NOTE — PROGRESS NOTES
All Individuals Present: Patient and Provider (Encounter Provider)     ID: Yarelis Lew is a 54 y.o. female      Pt met with [facility] for follow-up visit for symptoms of PTSD.    An interactive audio and video telecommunication system which permits real time communications between the patient (at the originating site) and the provider (at the distant site) was utilized to provide this telehealth service. Verbal consent was requested and obtained from Yarelis Lew on this date 12/05/24 for a telehealth visit.    Pt is meeting with therapist as a part of Personalized Care.    Mental Status Exam  Oriented: Person, Place, and Time  Appearance: well groomed, appropriate eye contact  Affect/Mood: congruent with mood and topic of conversation  Memory:  intact  Speech:  Normal rate, volume, prosody  Thought:  Abstract reasoning appropriate to age  Judgment: Appropriate to age  Insight: intact  Attitude/Behavior: cooperative    Risk Assessment:  Imminent Risk of Suicide or Serious Self-Injury: None, denied  Imminent Risk of Violence or Homicide: None, denied    Progress Note:  Pt shared that she was very excited to spend time with her daughters over the holiday weekend. She stated that she is being careful with how she interacts with her daughter going forward since it has been a few months since her daughter has reached out to her. Pt stated that she is having frustration with chronic pain and continues to feel irritated about her medical situation. She is working to reduce her stress and is planning to identify an alternative budget so that she can better-manage finances.    Treatment Goal: Reduce anxiety to improve medical outcomes.     Treatment modality/frequency: weekly individual therapy    Start Time: 1:09 pm  End Time: 1:56 pm  CPT Code: 50202    Attestation Statements   Number of minutes spent performing psychotherapy: 47

## 2024-12-12 ENCOUNTER — APPOINTMENT (OUTPATIENT)
Dept: BEHAVIORAL HEALTH | Facility: CLINIC | Age: 54
End: 2024-12-12
Payer: MEDICAID

## 2024-12-12 DIAGNOSIS — F43.10 PTSD (POST-TRAUMATIC STRESS DISORDER): ICD-10-CM

## 2024-12-12 PROCEDURE — 90834 PSYTX W PT 45 MINUTES: CPT | Performed by: COUNSELOR

## 2024-12-12 NOTE — PROGRESS NOTES
All Individuals Present: Patient and Provider (Encounter Provider)     ID: Yarelis Lew is a 54 y.o. female      Pt met with [facility] for follow-up visit for symptoms of anxiety related to PTSD..    An interactive audio and video telecommunication system which permits real time communications between the patient (at the originating site) and the provider (at the distant site) was utilized to provide this telehealth service. Verbal consent was requested and obtained from Yarelis Lew on this date 12/12/24 for a telehealth visit.    Pt is meeting with therapist as a part of Personalized Care.    Mental Status Exam  Oriented: Person, Place, and Time  Appearance: well groomed, appropriate eye contact  Affect/Mood: sad/tearful and anxious  Memory:  intact  Speech:  Normal rate, volume, prosody  Thought:  Abstract reasoning appropriate to age  Judgment: Appropriate to age  Insight: intact  Attitude/Behavior: cooperative    Risk Assessment:  Imminent Risk of Suicide or Serious Self-Injury: None, denied  Imminent Risk of Violence or Homicide: None, denied    Progress Note:  Pt shared that she is feeling frustrated today about her medical injuries and is feeling lonely today. She shared that she is feeling sad about how things are currently happening in her life. [facility] suggested Pt use mindfulness to help her quiet her thoughts so that it's easier to discover what she can do next with her life. Pt stated she feels like she is drowning, but she does want to get jesse stable.    Treatment Goal: Reduce anxiety to improve medical outcomes.     Treatment modality/frequency: weekly individual therapy    Start Time: 2:10 pm  End Time: 2:58 pm  CPT Code: 47822    Attestation Statements   Number of minutes spent performing psychotherapy: 48

## 2024-12-14 PROCEDURE — RXMED WILLOW AMBULATORY MEDICATION CHARGE

## 2024-12-16 ENCOUNTER — PHARMACY VISIT (OUTPATIENT)
Dept: PHARMACY | Facility: CLINIC | Age: 54
End: 2024-12-16
Payer: MEDICAID

## 2024-12-19 ENCOUNTER — APPOINTMENT (OUTPATIENT)
Dept: BEHAVIORAL HEALTH | Facility: CLINIC | Age: 54
End: 2024-12-19
Payer: MEDICAID

## 2024-12-19 DIAGNOSIS — F43.10 PTSD (POST-TRAUMATIC STRESS DISORDER): ICD-10-CM

## 2024-12-19 PROCEDURE — 90837 PSYTX W PT 60 MINUTES: CPT | Performed by: COUNSELOR
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
## 2025-01-09 ENCOUNTER — APPOINTMENT (OUTPATIENT)
Dept: BEHAVIORAL HEALTH | Facility: CLINIC | Age: 55
End: 2025-01-09
Payer: MEDICAID

## 2025-01-09 DIAGNOSIS — F43.10 PTSD (POST-TRAUMATIC STRESS DISORDER): ICD-10-CM

## 2025-01-09 PROCEDURE — 90837 PSYTX W PT 60 MINUTES: CPT | Performed by: COUNSELOR
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
## 2025-01-13 PROCEDURE — RXMED WILLOW AMBULATORY MEDICATION CHARGE

## 2025-01-16 ENCOUNTER — APPOINTMENT (OUTPATIENT)
Dept: BEHAVIORAL HEALTH | Facility: CLINIC | Age: 55
End: 2025-01-16
Payer: MEDICAID

## 2025-01-16 DIAGNOSIS — F43.10 PTSD (POST-TRAUMATIC STRESS DISORDER): ICD-10-CM

## 2025-01-16 PROCEDURE — 90834 PSYTX W PT 45 MINUTES: CPT | Performed by: COUNSELOR

## 2025-01-16 NOTE — PROGRESS NOTES
All Individuals Present: Patient and Provider (Encounter Provider)     ID: Yarelis Lew is a 54 y.o. female      Pt met with [facility] for follow-up visit for symptoms of PTSD.    An interactive audio and video telecommunication system which permits real time communications between the patient (at the originating site) and the provider (at the distant site) was utilized to provide this telehealth service. Verbal consent was requested and obtained from Yarelis Lew on this date 01/16/25 for a telehealth visit.    Pt is meeting with therapist as a part of Personalized Care.    Mental Status Exam  Oriented: Person, Place, and Time  Appearance: well groomed, appropriate eye contact  Affect/Mood: anxious and congruent with mood and topic of conversation  Memory:  intact  Speech:  Normal rate, volume, prosody  Thought:  Abstract reasoning appropriate to age  Judgment: Appropriate to age  Insight: intact  Attitude/Behavior: cooperative    Risk Assessment:  Imminent Risk of Suicide or Serious Self-Injury: None, denied  Imminent Risk of Violence or Homicide: None, denied    Progress Note:  Pt shared that she has been struggling with a lot of pain over the past week. She stated that she has been having pain in her back, groin, and hands. She reported that she feels like her illnesses are taking a toll on her overall health. Pt discussed her desire for boundaries with others in her life. [facility] encouraged Pt to establish boundaries that feel right for her.     Treatment Goal: Reduce anxiety to improve medical outcomes.     Treatment modality/frequency: weekly individual therapy    Start Time: 1:07 pm  End Time: 1:58 pm  CPT Code: 00602    Attestation Statements   Number of minutes spent performing psychotherapy: 51

## 2025-01-17 ENCOUNTER — PHARMACY VISIT (OUTPATIENT)
Dept: PHARMACY | Facility: CLINIC | Age: 55
End: 2025-01-17
Payer: MEDICAID

## 2025-01-23 ENCOUNTER — APPOINTMENT (OUTPATIENT)
Dept: BEHAVIORAL HEALTH | Facility: CLINIC | Age: 55
End: 2025-01-23
Payer: MEDICAID

## 2025-01-23 DIAGNOSIS — F43.10 PTSD (POST-TRAUMATIC STRESS DISORDER): ICD-10-CM

## 2025-01-23 PROCEDURE — 90837 PSYTX W PT 60 MINUTES: CPT | Performed by: COUNSELOR

## 2025-01-23 NOTE — PROGRESS NOTES
All Individuals Present: Patient and Provider (Encounter Provider)     ID: Yarelis Lew is a 54 y.o. female      Pt met with [facility] for follow-up visit for symptoms of PTSD.    An interactive audio and video telecommunication system which permits real time communications between the patient (at the originating site) and the provider (at the distant site) was utilized to provide this telehealth service. Verbal consent was requested and obtained from Yarelis Lew on this date 01/23/25 for a telehealth visit.    Pt is meeting with therapist as a part of Personalized Care.    Mental Status Exam  Oriented: Person, Place, and Time  Appearance: well groomed, appropriate eye contact  Affect/Mood: anxious and congruent with mood and topic of conversation  Memory:  intact  Speech:  Normal rate, volume, prosody  Thought:  Abstract reasoning appropriate to age  Judgment: Appropriate to age  Insight: intact  Attitude/Behavior: cooperative    Risk Assessment:  Imminent Risk of Suicide or Serious Self-Injury: None, denied  Imminent Risk of Violence or Homicide: None, denied    Progress Note:  Pt shared that she is focused on getting out of her current living situation. She reported that she is trying to make changes in her life to simplify her home so that she can find a new option. [facility] validated Pt for her ability to make positive decisions for herself. Pt noted that she wants to find a place that does not require as much upkeep so that she can take better care for herself. She processed the importance of letting go of things and memories that no longer serve her.     Treatment Goal: Reduce anxiety to improve medical outcomes.     Treatment modality/frequency: weekly individual therapy    Start Time: 1:04 pm  End Time: 1:58 pm  CPT Code: 67649    Attestation Statements   Number of minutes spent performing psychotherapy: 54

## 2025-01-30 ENCOUNTER — APPOINTMENT (OUTPATIENT)
Dept: BEHAVIORAL HEALTH | Facility: CLINIC | Age: 55
End: 2025-01-30
Payer: MEDICAID

## 2025-01-30 DIAGNOSIS — F43.10 PTSD (POST-TRAUMATIC STRESS DISORDER): ICD-10-CM

## 2025-01-30 PROCEDURE — 90837 PSYTX W PT 60 MINUTES: CPT | Performed by: COUNSELOR

## 2025-01-30 NOTE — PROGRESS NOTES
All Individuals Present: Patient and Provider (Encounter Provider)     ID: Yarelis Lew is a 54 y.o. female      Pt met with [facility] for follow-up visit for symptoms of PTSD.    An interactive audio and video telecommunication system which permits real time communications between the patient (at the originating site) and the provider (at the distant site) was utilized to provide this telehealth service. Verbal consent was requested and obtained from Yarelis Lew on this date 01/30/25 for a telehealth visit.    Pt is meeting with therapist as a part of Personalized Care.    Mental Status Exam  Oriented: Person, Place, and Time  Appearance: well groomed, appropriate eye contact  Affect/Mood: dysphoric, constricted but reactive and anxious  Memory:  intact  Speech:  Normal rate, volume, prosody  Thought:  Abstract reasoning appropriate to age  Judgment: Appropriate to age  Insight: intact  Attitude/Behavior: cooperative    Risk Assessment:  Imminent Risk of Suicide or Serious Self-Injury: None, denied  Imminent Risk of Violence or Homicide: None, denied    Progress Note:  Pt shared that she is working on cleaning up her house to get it on the market, but she is having difficulty getting things done. She noted that she has been feeling very tired and is working on managing how she can better-manage her situation. Pt shared that she is working with her daughter to help clear things from her house and is also considering new living options. [facility] validated Pt thinking and challenged her to consider how she can continue to make changes in a reasonable period of time.    Treatment Goal: Reduce anxiety to improve medical outcomes.     Treatment modality/frequency: weekly individual therapy    Start Time: 1:00 pm  End Time: 1:59 pm  CPT Code: 58157    Attestation Statements   Number of minutes spent performing psychotherapy: 59

## 2025-02-06 ENCOUNTER — APPOINTMENT (OUTPATIENT)
Dept: BEHAVIORAL HEALTH | Facility: CLINIC | Age: 55
End: 2025-02-06
Payer: MEDICAID

## 2025-02-06 DIAGNOSIS — F43.10 PTSD (POST-TRAUMATIC STRESS DISORDER): ICD-10-CM

## 2025-02-06 PROCEDURE — 90834 PSYTX W PT 45 MINUTES: CPT | Performed by: COUNSELOR

## 2025-02-06 NOTE — PROGRESS NOTES
All Individuals Present: Patient and Provider (Encounter Provider)     ID: Yarelis Lew is a 54 y.o. female      Pt met with [facility] for follow-up visit for symptoms of PTSD.    An interactive audio and video telecommunication system which permits real time communications between the patient (at the originating site) and the provider (at the distant site) was utilized to provide this telehealth service. Verbal consent was requested and obtained from Yarelis Lew on this date 02/06/25 for a telehealth visit.    Pt is meeting with therapist as a part of Personalized Care.    Mental Status Exam  Oriented: Person, Place, and Time  Appearance: well groomed, appropriate eye contact  Affect/Mood: anxious and congruent with mood and topic of conversation  Memory:  intact  Speech:  Normal rate, volume, prosody  Thought:  Abstract reasoning appropriate to age  Judgment: Appropriate to age  Insight: intact  Attitude/Behavior: cooperative    Risk Assessment:  Imminent Risk of Suicide or Serious Self-Injury: None, denied  Imminent Risk of Violence or Homicide: None, denied    Progress Note:  Pt discussed how she is managing relationships with her daughter and granddaughter. She reported that she often feels accused of playing a victim, but she does not feel this is an accurate portrayal of her. [facility] validated Pt and challenged the idea that she is being a victim. Pt reported that she works hard to do for herself and does not feel this is fair to her.    Treatment Goal: Reduce anxiety to improve medical outcomes.     Treatment modality/frequency: weekly individual therapy    Start Time: 1:03 pm  End Time: 1:50 pm  CPT Code: 45960     Attestation Statements   Number of minutes spent performing psychotherapy: 47

## 2025-02-13 ENCOUNTER — APPOINTMENT (OUTPATIENT)
Dept: BEHAVIORAL HEALTH | Facility: CLINIC | Age: 55
End: 2025-02-13
Payer: MEDICAID

## 2025-02-13 DIAGNOSIS — F43.10 PTSD (POST-TRAUMATIC STRESS DISORDER): ICD-10-CM

## 2025-02-13 PROCEDURE — RXMED WILLOW AMBULATORY MEDICATION CHARGE

## 2025-02-13 PROCEDURE — 90837 PSYTX W PT 60 MINUTES: CPT | Performed by: COUNSELOR

## 2025-02-13 NOTE — PROGRESS NOTES
All Individuals Present: Patient and Provider (Encounter Provider)     ID: Yaerlis Lew is a 54 y.o. female      Pt met with [facility] for follow-up visit for symptoms of PTSD.    An interactive audio and video telecommunication system which permits real time communications between the patient (at the originating site) and the provider (at the distant site) was utilized to provide this telehealth service. Verbal consent was requested and obtained from Yarelis Lew on this date 02/13/25 for a telehealth visit.    Pt is meeting with therapist as a part of Personalized Care.    Mental Status Exam  Oriented: Person, Place, and Time  Appearance: well groomed, appropriate eye contact  Affect/Mood: anxious and congruent with mood and topic of conversation  Memory:  intact  Speech:  Normal rate, volume, prosody  Thought:  Abstract reasoning appropriate to age  Judgment: Appropriate to age  Insight: intact  Attitude/Behavior: cooperative    Risk Assessment:  Imminent Risk of Suicide or Serious Self-Injury: None, denied  Imminent Risk of Violence or Homicide: None, denied    Progress Note:  Pt shared that she is working on getting her car fixed and is managing things around her home. She stated that the work has been difficult because her body is not as strong as it once was, but she is making progress. [facility] processed with Pt how past relationships impact current behaviors. Pt reported that she is going to continue to process how her past is impacting her day to day.    Treatment Goal: Reduce anxiety to improve medical outcomes.     Treatment modality/frequency: weekly individual therapy    Start Time: 1:05 pm  End Time: 2:00 pm  CPT Code: 35412    Attestation Statements   Number of minutes spent performing psychotherapy: 55

## 2025-02-14 ENCOUNTER — PHARMACY VISIT (OUTPATIENT)
Dept: PHARMACY | Facility: CLINIC | Age: 55
End: 2025-02-14
Payer: MEDICAID

## 2025-02-17 NOTE — PROGRESS NOTES
Physical Therapy    Discharge Summary    Name: Yarelis Lew  MRN: [id number]  : 1970  Date: 2024     Discharge Summary: PT        Rehab Discharge Reason: Failed to schedule and/or keep follow-up appointment(s)

## 2025-02-20 ENCOUNTER — APPOINTMENT (OUTPATIENT)
Dept: BEHAVIORAL HEALTH | Facility: CLINIC | Age: 55
End: 2025-02-20
Payer: MEDICAID

## 2025-02-27 ENCOUNTER — APPOINTMENT (OUTPATIENT)
Dept: BEHAVIORAL HEALTH | Facility: CLINIC | Age: 55
End: 2025-02-27
Payer: MEDICAID

## 2025-02-27 DIAGNOSIS — F43.10 PTSD (POST-TRAUMATIC STRESS DISORDER): ICD-10-CM

## 2025-02-27 PROCEDURE — 90837 PSYTX W PT 60 MINUTES: CPT | Performed by: COUNSELOR

## 2025-02-27 NOTE — PROGRESS NOTES
All Individuals Present: Patient and Provider (Encounter Provider)     ID: Yarelis Lew is a 54 y.o. female      Pt met with [facility] for follow-up visit for symptoms of PTSD.    An interactive audio and video telecommunication system which permits real time communications between the patient (at the originating site) and the provider (at the distant site) was utilized to provide this telehealth service. Verbal consent was requested and obtained from Yarelis Lew on this date 02/27/25 for a telehealth visit.    Mental Status Exam  Oriented: Person, Place, and Time  Appearance: well groomed, appropriate eye contact  Affect/Mood: anxious  Memory:  intact  Speech:  Normal rate, volume, prosody  Thought:  Abstract reasoning appropriate to age  Judgment: Appropriate to age  Insight: intact  Attitude/Behavior: cooperative    Risk Assessment:  Imminent Risk of Suicide or Serious Self-Injury: None, denied  Imminent Risk of Violence or Homicide: None, denied    Progress Note:  Pt shared that she is working hard at clearing out her home. She stated that she is also focused on letting go of people who are no longer serving any purpose. Pt processed her thoughts on letting go. [facility] validated that she may be lonely at first, but this will allow the right people to come into her life. Pt agreed that she is ready for a positive change in her life.     Treatment Goal: Reduce anxiety to improve medical outcomes.     Treatment modality/frequency: weekly individual therapy    Start Time: 1:02 pm  End Time: 1:58 pm  CPT Code: 31611    Attestation Statements   Number of minutes spent performing psychotherapy: 56

## 2025-03-06 ENCOUNTER — APPOINTMENT (OUTPATIENT)
Dept: BEHAVIORAL HEALTH | Facility: CLINIC | Age: 55
End: 2025-03-06
Payer: MEDICAID

## 2025-03-06 DIAGNOSIS — F43.10 PTSD (POST-TRAUMATIC STRESS DISORDER): ICD-10-CM

## 2025-03-06 PROCEDURE — 90837 PSYTX W PT 60 MINUTES: CPT | Performed by: COUNSELOR

## 2025-03-06 NOTE — PROGRESS NOTES
All Individuals Present: Patient and Provider (Encounter Provider)     ID: Yarelis Lew is a 54 y.o. female      Pt met with [facility] for follow-up visit for symptoms of PTSD.    An interactive audio and video telecommunication system which permits real time communications between the patient (at the originating site) and the provider (at the distant site) was utilized to provide this telehealth service. Verbal consent was requested and obtained from Yarelis Lew on this date 03/06/25 for a telehealth visit.    Mental Status Exam  Oriented: Person, Place, and Time  Appearance: well groomed, appropriate eye contact  Affect/Mood: anxious and congruent with mood and topic of conversation  Memory:  intact  Speech:  Normal rate, volume, prosody  Thought:  Abstract reasoning appropriate to age  Judgment: Appropriate to age  Insight: intact  Attitude/Behavior: cooperative    Risk Assessment:  Imminent Risk of Suicide or Serious Self-Injury: None, denied  Imminent Risk of Violence or Homicide: None, denied    Progress Note:  Pt shared that she is feeling a lot of stress while she is trying to get her home together. She is working on getting motivated to keep moving forward. Pt stated that she is looking at options for new opportunities in places where she can be less reliant on a vehicle to travel. [facility] validated Pt and challenged her to continue to find motivation.     Treatment Goal: Reduce anxiety to improve medical outcomes.     Treatment modality/frequency: weekly individual therapy    Start Time: 1:03 pm  End Time: 1:58 pm  CPT Code: 81573    Attestation Statements   Number of minutes spent performing psychotherapy: 55

## 2025-03-11 PROCEDURE — RXMED WILLOW AMBULATORY MEDICATION CHARGE

## 2025-03-13 ENCOUNTER — PHARMACY VISIT (OUTPATIENT)
Dept: PHARMACY | Facility: CLINIC | Age: 55
End: 2025-03-13
Payer: MEDICAID

## 2025-03-13 ENCOUNTER — APPOINTMENT (OUTPATIENT)
Dept: BEHAVIORAL HEALTH | Facility: CLINIC | Age: 55
End: 2025-03-13
Payer: MEDICAID

## 2025-03-13 DIAGNOSIS — F43.10 PTSD (POST-TRAUMATIC STRESS DISORDER): ICD-10-CM

## 2025-03-13 PROCEDURE — 90834 PSYTX W PT 45 MINUTES: CPT | Performed by: COUNSELOR

## 2025-03-13 NOTE — PROGRESS NOTES
All Individuals Present: Patient and Provider (Encounter Provider)     ID: Yarelis Lew is a 54 y.o. female      Pt met with [facility] for follow-up visit for symptoms of PTSD.    An interactive audio and video telecommunication system which permits real time communications between the patient (at the originating site) and the provider (at the distant site) was utilized to provide this telehealth service. Verbal consent was requested and obtained from Yarelis Lew on this date 03/13/25 for a telehealth visit.    Mental Status Exam  Oriented: Person, Place, and Time  Appearance: well groomed, appropriate eye contact  Affect/Mood: anxious and congruent with mood and topic of conversation  Memory:  intact  Speech:  Normal rate, volume, prosody  Thought:  Abstract reasoning appropriate to age  Judgment: Appropriate to age  Insight: intact  Attitude/Behavior: cooperative    Risk Assessment:  Imminent Risk of Suicide or Serious Self-Injury: None, denied  Imminent Risk of Violence or Homicide: None, denied    Progress Note:  Pt shared that she is working hard to manage her feelings of anxiety about working on her home. She continues to prepare the house to move, but reported that she is exhausted and feels unable to do much. Pt is anxious about what the next steps are for her due to financial and physical concerns. [facility] validated Pt and encouraged her to continue to have trust in her ability. Pt agreed that she is working hard to stay motivated.    Treatment Goal: Reduce anxiety to improve medical outcomes.     Treatment modality/frequency: weekly individual therapy    Start Time: 1:05 pm  End Time: 1:56 pm  CPT Code: 25141    Attestation Statements   Number of minutes spent performing psychotherapy: 51

## 2025-03-20 ENCOUNTER — APPOINTMENT (OUTPATIENT)
Dept: BEHAVIORAL HEALTH | Facility: CLINIC | Age: 55
End: 2025-03-20
Payer: MEDICAID

## 2025-03-20 DIAGNOSIS — F43.10 PTSD (POST-TRAUMATIC STRESS DISORDER): ICD-10-CM

## 2025-03-20 PROCEDURE — 90837 PSYTX W PT 60 MINUTES: CPT | Performed by: COUNSELOR

## 2025-03-20 NOTE — PROGRESS NOTES
All Individuals Present: Patient and Provider (Encounter Provider)     ID: Yarelis Lew is a 54 y.o. female      Pt met with [facility] for follow-up visit for symptoms of PTSD.    An interactive audio and video telecommunication system which permits real time communications between the patient (at the originating site) and the provider (at the distant site) was utilized to provide this telehealth service. Verbal consent was requested and obtained from Yarelis Lew on this date 03/20/25 for a telehealth visit.    Mental Status Exam  Oriented: Person, Place, and Time  Appearance: well groomed, appropriate eye contact  Affect/Mood: dysphoric, constricted but reactive and congruent with mood and topic of conversation  Memory:  intact  Speech:  Normal rate, volume, prosody  Thought:  Abstract reasoning appropriate to age  Judgment: Appropriate to age  Insight: intact  Attitude/Behavior: cooperative    Risk Assessment:  Imminent Risk of Suicide or Serious Self-Injury: None, denied  Imminent Risk of Violence or Homicide: None, denied    Progress Note:  Pt shared that she has been very tired and has struggled to feel like she is getting her things done. She continues to push toward finding a new living situation. She stated that she is feeling lost and a little unwanted recently, so she is uncertain what to do next. [facility] validated Pt and reminded her that she has worth and value, even if others cannot recognize her value. Pt reported that she is focused on doing what is best for herself.     Treatment Goal: Reduce anxiety to improve medical outcomes.     Treatment modality/frequency: weekly individual therapy    Start Time: 1:03 pm  End Time: 1:57 pm  CPT Code: 11474    Attestation Statements   Number of minutes spent performing psychotherapy: 54

## 2025-03-27 ENCOUNTER — APPOINTMENT (OUTPATIENT)
Dept: BEHAVIORAL HEALTH | Facility: CLINIC | Age: 55
End: 2025-03-27
Payer: MEDICAID

## 2025-03-27 DIAGNOSIS — F43.10 PTSD (POST-TRAUMATIC STRESS DISORDER): ICD-10-CM

## 2025-03-27 PROCEDURE — 90837 PSYTX W PT 60 MINUTES: CPT | Performed by: COUNSELOR

## 2025-03-27 NOTE — PROGRESS NOTES
All Individuals Present: Patient and Provider (Encounter Provider)     ID: Yarelis Lew is a 54 y.o. female      Pt met with [facility] for follow-up visit for symptoms of PTSD.    An interactive audio and video telecommunication system which permits real time communications between the patient (at the originating site) and the provider (at the distant site) was utilized to provide this telehealth service. Verbal consent was requested and obtained from Yarelis Lew on this date 03/27/25 for a telehealth visit.    Mental Status Exam  Oriented: Person, Place, and Time  Appearance: well groomed, appropriate eye contact  Affect/Mood: dysphoric, constricted but reactive and congruent with mood and topic of conversation  Memory:  intact  Speech:  Normal rate, volume, prosody  Thought:  Abstract reasoning appropriate to age  Judgment: Appropriate to age  Insight: intact  Attitude/Behavior: cooperative    Risk Assessment:  Imminent Risk of Suicide or Serious Self-Injury: None, denied  Imminent Risk of Violence or Homicide: None, denied    Progress Note:  Pt shared that she has been reading about seizure disorders over the past few days and has started to recognize how her past symptoms of what she thought was hypoglycemia may have been epilepsy. She noted that she feels that this explains a lot about her past. [facility] validated Pt and encouraged her to continue to take her medications and stretch to keep from causing future damage to her body. Pt agreed and noted that she is glad to have a more clear answer to her history of passing out at various times in her life.     Treatment Goal: Reduce anxiety to improve medical outcomes.     Treatment modality/frequency: weekly individual therapy    Start Time: 1:02 pm  End Time: 1:59 pm  CPT Code: 77786    Attestation Statements   Number of minutes spent performing psychotherapy: 57

## 2025-04-03 ENCOUNTER — APPOINTMENT (OUTPATIENT)
Dept: BEHAVIORAL HEALTH | Facility: CLINIC | Age: 55
End: 2025-04-03
Payer: MEDICAID

## 2025-04-10 ENCOUNTER — APPOINTMENT (OUTPATIENT)
Dept: BEHAVIORAL HEALTH | Facility: CLINIC | Age: 55
End: 2025-04-10
Payer: MEDICAID

## 2025-04-10 DIAGNOSIS — F43.10 PTSD (POST-TRAUMATIC STRESS DISORDER): ICD-10-CM

## 2025-04-10 DIAGNOSIS — M43.25 FUSION OF SPINE, THORACOLUMBAR REGION: Primary | ICD-10-CM

## 2025-04-10 PROCEDURE — RXMED WILLOW AMBULATORY MEDICATION CHARGE

## 2025-04-10 PROCEDURE — 90837 PSYTX W PT 60 MINUTES: CPT | Performed by: COUNSELOR

## 2025-04-10 RX ORDER — DICLOXACILLIN SODIUM 500 MG/1
500 CAPSULE ORAL DAILY
Qty: 90 CAPSULE | Refills: 3 | Status: SHIPPED | OUTPATIENT
Start: 2025-04-10 | End: 2026-04-10

## 2025-04-10 NOTE — PROGRESS NOTES
All Individuals Present: Patient and Provider (Encounter Provider)     ID: Yarelis Lew is a 54 y.o. female      Pt met with [facility] for follow-up visit for symptoms of PTSD.    An interactive audio and video telecommunication system which permits real time communications between the patient (at the originating site) and the provider (at the distant site) was utilized to provide this telehealth service. Verbal consent was requested and obtained from Yarelis Lew on this date 04/10/25 for a telehealth visit.    Pt is meeting with therapist as a part of Personalized Care.    Mental Status Exam  Oriented: Person, Place, and Time  Appearance: well groomed, appropriate eye contact  Affect/Mood: dysphoric, constricted but reactive and congruent with mood and topic of conversation  Memory:  intact  Speech:  Normal rate, volume, prosody  Thought:  Abstract reasoning appropriate to age  Judgment: Appropriate to age  Insight: intact  Attitude/Behavior: cooperative    Risk Assessment:  Imminent Risk of Suicide or Serious Self-Injury: passive suicidal thoughts  Imminent Risk of Violence or Homicide: None, denied    Progress Note:  Pt reported that she is not feeling well at all today. She stated that she is having a lot of back and jaw pain, which feels debilitating. Pt shared that she has been experiencing cold and how sweats through the evening and into the day. [facility] encouraged Pt to take time to rest and possibly soak in a tub of epsom salts. Pt shared that she does regularly soak in the tub, but she is feeling so achy that it is difficult to do so at this time. [facility] challenged Pt to consider if she might need to go to urgent care. Pt is going to contact her primary care doctor. She noted that she feels much of this physical and mental pain is a result of her stress. Pt did share that she is having thoughts that she does not want to be on earth, but she identified that she is worried about her granddaughter. She noted  that she can maintain her safety and does not have a plan.    Treatment Goal: Reduce anxiety to improve medical outcomes.     Treatment modality/frequency: weekly individual therapy    Start Time: 1:01 pm  End Time: 1:58 pm  CPT Code: [id number]    Attestation Statements   Number of minutes spent performing psychotherapy: 57

## 2025-04-14 ENCOUNTER — PHARMACY VISIT (OUTPATIENT)
Dept: PHARMACY | Facility: CLINIC | Age: 55
End: 2025-04-14
Payer: MEDICAID

## 2025-04-17 ENCOUNTER — APPOINTMENT (OUTPATIENT)
Dept: BEHAVIORAL HEALTH | Facility: CLINIC | Age: 55
End: 2025-04-17
Payer: MEDICAID

## 2025-04-17 DIAGNOSIS — F43.10 PTSD (POST-TRAUMATIC STRESS DISORDER): ICD-10-CM

## 2025-04-17 PROCEDURE — 90837 PSYTX W PT 60 MINUTES: CPT | Performed by: COUNSELOR

## 2025-04-17 NOTE — PROGRESS NOTES
All Individuals Present: Patient and Provider (Encounter Provider)     ID: Yarelis Lew is a 54 y.o. female      Pt met with [facility] for follow-up visit for symptoms of PTSD.    An interactive audio and video telecommunication system which permits real time communications between the patient (at the originating site) and the provider (at the distant site) was utilized to provide this telehealth service. Verbal consent was requested and obtained from Yarelis Lew on this date 04/17/25 for a telehealth visit.    Mental Status Exam  Oriented: Person, Place, and Time  Appearance: well groomed, appropriate eye contact  Affect/Mood: anxious and congruent with mood and topic of conversation  Memory:  intact  Speech:  Normal rate, volume, prosody  Thought:  Abstract reasoning appropriate to age  Judgment: Appropriate to age  Insight: intact  Attitude/Behavior: cooperative    Risk Assessment:  Imminent Risk of Suicide or Serious Self-Injury: None, denied  Imminent Risk of Violence or Homicide: None, denied    Progress Note:  Pt stated that she has been feeling anxious about whether she is going to move and what her living options are. She has been able to speak to a few realtors and is working on a plan to move forward. Pt reported that she has been struggling with her feelings about family. She noted that her children have been unsupportive of her and she is ready to put space between them. [facility] validated Pt and challenged her to remember her boundaries.    Treatment Goal: Reduce anxiety to improve medical outcomes.     Treatment modality/frequency: weekly individual therapy    Start Time: 1:04 pm  End Time: 2:00 pm  CPT Code: 11690    Attestation Statements   Number of minutes spent performing psychotherapy: 56

## 2025-05-02 ENCOUNTER — APPOINTMENT (OUTPATIENT)
Dept: BEHAVIORAL HEALTH | Facility: CLINIC | Age: 55
End: 2025-05-02
Payer: MEDICAID

## 2025-05-02 DIAGNOSIS — F43.10 PTSD (POST-TRAUMATIC STRESS DISORDER): ICD-10-CM

## 2025-05-02 PROCEDURE — 90837 PSYTX W PT 60 MINUTES: CPT | Performed by: COUNSELOR

## 2025-05-02 NOTE — PROGRESS NOTES
All Individuals Present: Patient and Provider (Encounter Provider)     ID: Yarelis Lew is a 54 y.o. female      Pt met with [facility] for follow-up visit for symptoms of PTSD.    An interactive audio and video telecommunication system which permits real time communications between the patient (at the originating site) and the provider (at the distant site) was utilized to provide this telehealth service. Verbal consent was requested and obtained from Yarelis Lew on this date 05/02/25 for a telehealth visit.    Mental Status Exam  Oriented: Person, Place, and Time  Appearance: well groomed, appropriate eye contact  Affect/Mood: anxious and congruent with mood and topic of conversation  Memory:  intact  Speech:  Normal rate, volume, prosody  Thought:  Abstract reasoning appropriate to age  Judgment: Appropriate to age  Insight: intact  Attitude/Behavior: cooperative    Risk Assessment:  Imminent Risk of Suicide or Serious Self-Injury: None, denied  Imminent Risk of Violence or Homicide: None, denied    Progress Note:  Pt shared that she is feeling positive movement as she is organizing to move soon. She reported that she is anxious about it, but is aware that the property is too much for her, so she feels like it will be a better situation to move somewhere with less maintenance. [facility] validated Pt and challenged her to reframe her thoughts to encourage positive self-image. Pt identified that she is glad to be making positive changes in her life.    Treatment Goal: Reduce anxiety to improve medical outcomes.     Treatment modality/frequency: weekly individual therapy    Start Time: 1:01 pm  End Time: 1:59 pm  CPT Code: 43966    Attestation Statements   Number of minutes spent performing psychotherapy: 58

## 2025-05-08 ENCOUNTER — APPOINTMENT (OUTPATIENT)
Dept: BEHAVIORAL HEALTH | Facility: CLINIC | Age: 55
End: 2025-05-08
Payer: MEDICAID

## 2025-05-08 DIAGNOSIS — F43.10 PTSD (POST-TRAUMATIC STRESS DISORDER): ICD-10-CM

## 2025-05-08 PROCEDURE — RXMED WILLOW AMBULATORY MEDICATION CHARGE

## 2025-05-08 PROCEDURE — 90837 PSYTX W PT 60 MINUTES: CPT | Performed by: COUNSELOR

## 2025-05-08 NOTE — PROGRESS NOTES
All Individuals Present: Patient and Provider (Encounter Provider)     ID: Yarelis Lew is a 55 y.o. female      Pt met with [facility] for follow-up visit for symptoms of PTSD.    An interactive audio and video telecommunication system which permits real time communications between the patient (at the originating site) and the provider (at the distant site) was utilized to provide this telehealth service. Verbal consent was requested and obtained from Yarelis Lew on this date 05/08/25 for a telehealth visit.    Mental Status Exam  Oriented: Person, Place, and Time  Appearance: well groomed, appropriate eye contact  Affect/Mood: congruent with mood and topic of conversation  Memory:  intact  Speech:  Normal rate, volume, prosody  Thought:  Abstract reasoning appropriate to age  Judgment: Appropriate to age  Insight: intact  Attitude/Behavior: cooperative    Risk Assessment:  Imminent Risk of Suicide or Serious Self-Injury: None, denied  Imminent Risk of Violence or Homicide: None, denied    Progress Note:  Pt stated that she is feeling tired today and is spending today resting. She reported that she is changing her bedroom to the first floor so that she can limit the amount she needs to climb the steps in her home. Pt shared that she has been building new relationships with others, but she is maintaining appropriate boundaries for the time being. [facility] challenged Pt to recognize her worth as she expands her interactions with others.     Treatment Goal: Reduce anxiety to improve medical outcomes.     Treatment modality/frequency: weekly individual therapy    Start Time: 1:02 pm  End Time: 2:00 pm  CPT Code: 03989    Attestation Statements   Number of minutes spent performing psychotherapy: 58

## 2025-05-13 ENCOUNTER — PHARMACY VISIT (OUTPATIENT)
Dept: PHARMACY | Facility: CLINIC | Age: 55
End: 2025-05-13
Payer: MEDICAID

## 2025-05-21 ENCOUNTER — APPOINTMENT (OUTPATIENT)
Dept: BEHAVIORAL HEALTH | Facility: CLINIC | Age: 55
End: 2025-05-21
Payer: MEDICAID

## 2025-05-21 DIAGNOSIS — F43.10 PTSD (POST-TRAUMATIC STRESS DISORDER): ICD-10-CM

## 2025-05-21 PROCEDURE — 90837 PSYTX W PT 60 MINUTES: CPT | Performed by: COUNSELOR

## 2025-05-21 NOTE — PROGRESS NOTES
All Individuals Present: Patient and Provider (Encounter Provider)     ID: Yarelis Lew is a 55 y.o. female      Pt met with [facility] for follow-up visit for symptoms of PTSD.    An interactive audio and video telecommunication system which permits real time communications between the patient (at the originating site) and the provider (at the distant site) was utilized to provide this telehealth service. Verbal consent was requested and obtained from Yarelis Lew on this date 05/21/25 for a telehealth visit.    Pt is meeting with therapist as a part of Personalized [facility].    Mental Status Exam  Oriented: Person, Place, and Time  Appearance: well groomed, appropriate eye contact  Affect/Mood: anxious and congruent with mood and topic of conversation  Memory:  intact  Speech:  Normal rate, volume, prosody  Thought:  Abstract reasoning appropriate to age  Judgment: Appropriate to age  Insight: intact  Attitude/Behavior: cooperative    Risk Assessment:  Imminent Risk of Suicide or Serious Self-Injury: None, denied  Imminent Risk of Violence or Homicide: None, denied    Progress Note:  Pt shared that she is working very hard to get things moved out of her house, but she does not feel supported by her family at all. She noted that she is trying to get things moved, but does not have the physical strength or stamina to complete the projects. [facility] encouraged her to do what she can to earn some money from the things she has so that she can move forward with her life. Pt reported that she is doing her best, but often struggles because it is so much work.    Treatment Goal: Reduce anxiety to improve medical outcomes.     Treatment modality/frequency: weekly individual therapy    Start Time: 1:10 pm  End Time: 2:07 pm  CPT Code: 18557    Attestation Statements   Number of minutes spent performing psychotherapy: 57

## 2025-05-29 ENCOUNTER — APPOINTMENT (OUTPATIENT)
Dept: BEHAVIORAL HEALTH | Facility: CLINIC | Age: 55
End: 2025-05-29
Payer: MEDICAID

## 2025-05-29 DIAGNOSIS — F43.10 PTSD (POST-TRAUMATIC STRESS DISORDER): ICD-10-CM

## 2025-05-29 PROCEDURE — 90837 PSYTX W PT 60 MINUTES: CPT | Performed by: COUNSELOR
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
## 2025-06-11 RX ORDER — KETOROLAC TROMETHAMINE 10 MG/1
10 TABLET, FILM COATED ORAL EVERY 12 HOURS
COMMUNITY

## 2025-06-11 RX ORDER — NAFCILLIN SODIUM 2 G/8ML
INJECTION, POWDER, FOR SOLUTION INTRAMUSCULAR; INTRAVENOUS EVERY 4 HOURS
COMMUNITY

## 2025-06-11 RX ORDER — ALPRAZOLAM 0.5 MG/1
1 TABLET ORAL 2 TIMES DAILY PRN
COMMUNITY

## 2025-06-11 RX ORDER — OMEPRAZOLE 20 MG/1
1 CAPSULE, DELAYED RELEASE ORAL
COMMUNITY

## 2025-06-26 ENCOUNTER — APPOINTMENT (OUTPATIENT)
Dept: PRIMARY CARE | Facility: CLINIC | Age: 55
End: 2025-06-26
Payer: MEDICAID

## 2025-06-26 VITALS
HEIGHT: 67 IN | RESPIRATION RATE: 18 BRPM | TEMPERATURE: 97.7 F | WEIGHT: 121.5 LBS | BODY MASS INDEX: 19.07 KG/M2 | OXYGEN SATURATION: 97 % | HEART RATE: 71 BPM | SYSTOLIC BLOOD PRESSURE: 101 MMHG | DIASTOLIC BLOOD PRESSURE: 60 MMHG

## 2025-06-26 DIAGNOSIS — S22.080G CLOSED WEDGE COMPRESSION FRACTURE OF T12 VERTEBRA WITH DELAYED HEALING, SUBSEQUENT ENCOUNTER: ICD-10-CM

## 2025-06-26 DIAGNOSIS — G40.909 NONINTRACTABLE EPILEPSY WITHOUT STATUS EPILEPTICUS, UNSPECIFIED EPILEPSY TYPE (MULTI): ICD-10-CM

## 2025-06-26 DIAGNOSIS — T84.428S: ICD-10-CM

## 2025-06-26 DIAGNOSIS — Z12.31 VISIT FOR SCREENING MAMMOGRAM: ICD-10-CM

## 2025-06-26 DIAGNOSIS — S32.000S COMPRESSION FRACTURE OF LUMBAR VERTEBRA, UNSPECIFIED LUMBAR VERTEBRAL LEVEL, SEQUELA: ICD-10-CM

## 2025-06-26 DIAGNOSIS — Z13.220 LIPID SCREENING: ICD-10-CM

## 2025-06-26 DIAGNOSIS — M46.20: ICD-10-CM

## 2025-06-26 DIAGNOSIS — F43.10 PTSD (POST-TRAUMATIC STRESS DISORDER): ICD-10-CM

## 2025-06-26 DIAGNOSIS — Z01.419 WELL WOMAN EXAM: ICD-10-CM

## 2025-06-26 DIAGNOSIS — S22.000S CLOSED WEDGE FRACTURE OF THORACIC VERTEBRA, UNSPECIFIED THORACIC VERTEBRAL LEVEL, SEQUELA: ICD-10-CM

## 2025-06-26 PROBLEM — R10.813 RIGHT LOWER QUADRANT ABDOMINAL TENDERNESS: Status: ACTIVE | Noted: 2025-06-26

## 2025-06-26 PROBLEM — S22.000A WEDGE FRACTURE OF THORACIC VERTEBRA (MULTI): Status: RESOLVED | Noted: 2025-06-26 | Resolved: 2025-06-26

## 2025-06-26 PROBLEM — T81.49XA POSTOPERATIVE WOUND INFECTION: Status: RESOLVED | Noted: 2025-06-26 | Resolved: 2025-06-26

## 2025-06-26 PROBLEM — Z20.822 CONTACT WITH AND (SUSPECTED) EXPOSURE TO COVID-19: Status: RESOLVED | Noted: 2023-04-26 | Resolved: 2025-06-26

## 2025-06-26 PROBLEM — R73.9 HYPERGLYCEMIA: Status: ACTIVE | Noted: 2022-12-30

## 2025-06-26 PROBLEM — S22.000A WEDGE FRACTURE OF THORACIC VERTEBRA (MULTI): Status: ACTIVE | Noted: 2025-06-26

## 2025-06-26 PROBLEM — Z98.1 HISTORY OF LUMBAR FUSION: Status: ACTIVE | Noted: 2025-06-26

## 2025-06-26 PROBLEM — S93.409A SPRAIN OF ANKLE: Status: RESOLVED | Noted: 2025-06-26 | Resolved: 2025-06-26

## 2025-06-26 PROBLEM — K52.831 COLLAGENOUS COLITIS: Status: ACTIVE | Noted: 2025-06-26

## 2025-06-26 PROBLEM — N39.0 URINARY TRACT INFECTION: Status: RESOLVED | Noted: 2025-06-26 | Resolved: 2025-06-26

## 2025-06-26 PROBLEM — M25.559 ARTHRALGIA OF HIP: Status: ACTIVE | Noted: 2025-06-26

## 2025-06-26 PROBLEM — A41.9 SEPSIS (MULTI): Status: RESOLVED | Noted: 2023-04-26 | Resolved: 2025-06-26

## 2025-06-26 PROBLEM — K31.5: Status: ACTIVE | Noted: 2025-06-26

## 2025-06-26 PROBLEM — N95.1 PERIMENOPAUSE: Status: ACTIVE | Noted: 2025-06-26

## 2025-06-26 PROBLEM — S32.000A LUMBAR COMPRESSION FRACTURE (MULTI): Status: RESOLVED | Noted: 2023-06-08 | Resolved: 2025-06-26

## 2025-06-26 PROCEDURE — 1036F TOBACCO NON-USER: CPT | Performed by: INTERNAL MEDICINE

## 2025-06-26 PROCEDURE — 3008F BODY MASS INDEX DOCD: CPT | Performed by: INTERNAL MEDICINE

## 2025-06-26 PROCEDURE — 99215 OFFICE O/P EST HI 40 MIN: CPT | Performed by: INTERNAL MEDICINE

## 2025-06-26 RX ORDER — GABAPENTIN 300 MG/1
300 CAPSULE ORAL 3 TIMES DAILY
Qty: 270 CAPSULE | Refills: 1 | Status: SHIPPED | OUTPATIENT
Start: 2025-06-26 | End: 2026-06-26

## 2025-06-26 RX ORDER — DULOXETIN HYDROCHLORIDE 30 MG/1
30 CAPSULE, DELAYED RELEASE ORAL DAILY
Qty: 30 CAPSULE | Refills: 3 | Status: SHIPPED | OUTPATIENT
Start: 2025-06-26 | End: 2025-08-25

## 2025-06-26 RX ORDER — DICLOXACILLIN SODIUM 500 MG/1
500 CAPSULE ORAL DAILY
Qty: 3 CAPSULE | Refills: 3 | Status: SHIPPED | OUTPATIENT
Start: 2025-06-26

## 2025-06-26 RX ORDER — ALPRAZOLAM 0.5 MG/1
0.5 TABLET ORAL 2 TIMES DAILY PRN
Qty: 14 TABLET | Refills: 0 | Status: SHIPPED | OUTPATIENT
Start: 2025-06-26

## 2025-06-26 SDOH — ECONOMIC STABILITY: FOOD INSECURITY: WITHIN THE PAST 12 MONTHS, YOU WORRIED THAT YOUR FOOD WOULD RUN OUT BEFORE YOU GOT MONEY TO BUY MORE.: NEVER TRUE

## 2025-06-26 SDOH — ECONOMIC STABILITY: FOOD INSECURITY: WITHIN THE PAST 12 MONTHS, THE FOOD YOU BOUGHT JUST DIDN'T LAST AND YOU DIDN'T HAVE MONEY TO GET MORE.: NEVER TRUE

## 2025-06-26 ASSESSMENT — LIFESTYLE VARIABLES
SKIP TO QUESTIONS 9-10: 1
HOW MANY STANDARD DRINKS CONTAINING ALCOHOL DO YOU HAVE ON A TYPICAL DAY: 1 OR 2
HOW OFTEN DO YOU HAVE SIX OR MORE DRINKS ON ONE OCCASION: NEVER
AUDIT-C TOTAL SCORE: 1
HOW OFTEN DO YOU HAVE A DRINK CONTAINING ALCOHOL: MONTHLY OR LESS
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
## 2025-06-26 NOTE — ASSESSMENT & PLAN NOTE
Patient gets recurrent neuropathic pain and numbness. She has not had a seizure that she is aware of. Will refer to pain management with chiropractic for assessment.

## 2025-06-27 ENCOUNTER — APPOINTMENT (OUTPATIENT)
Dept: BEHAVIORAL HEALTH | Facility: CLINIC | Age: 55
End: 2025-06-27
Payer: MEDICAID

## 2025-06-27 DIAGNOSIS — F43.10 PTSD (POST-TRAUMATIC STRESS DISORDER): ICD-10-CM

## 2025-06-27 PROCEDURE — 90834 PSYTX W PT 45 MINUTES: CPT | Performed by: COUNSELOR

## 2025-06-27 NOTE — PROGRESS NOTES
All Individuals Present: Patient and Provider (Encounter Provider)     ID: Yarelis Lew is a 55 y.o. female      Pt met with [facility] for follow-up visit for symptoms of PTSD.    An interactive audio and video telecommunication system which permits real time communications between the patient (at the originating site) and the provider (at the distant site) was utilized to provide this telehealth service. Verbal consent was requested and obtained from Yarelis Lew on this date 06/27/25 for a telehealth visit.    Mental Status Exam  Oriented: Person, Place, and Time  Appearance: well groomed, appropriate eye contact  Affect/Mood: anxious, irritable, and congruent with mood and topic of conversation  Memory:  intact  Speech:  Normal rate, volume, prosody  Thought:  Abstract reasoning appropriate to age  Judgment: Appropriate to age  Insight: intact  Attitude/Behavior: cooperative    Risk Assessment:  Imminent Risk of Suicide or Serious Self-Injury: None, denied  Imminent Risk of Violence or Homicide: None, denied    Progress Note:  Pt shared that she is feeling frustrated about trying to coordinate her move. She reported that she has been asking for support for several months and was ignored by her family. She stated that she is feeling tingling in her neck and arms. [facility] validated Pt concerns and encouraged her to stretch lightly to relieve the sensation. Pt reported that she noticed a reduction in her symptoms as she discussed the situation.     Treatment Goal: Reduce anxiety to improve medical outcomes.     Treatment modality/frequency: weekly individual therapy    Start Time: 1:02 pm  End Time: 1:51 pm  CPT Code: 65639     Attestation Statements   Number of minutes spent performing psychotherapy: 49

## 2025-07-03 ENCOUNTER — APPOINTMENT (OUTPATIENT)
Dept: BEHAVIORAL HEALTH | Facility: CLINIC | Age: 55
End: 2025-07-03
Payer: MEDICAID

## 2025-07-03 DIAGNOSIS — F43.10 PTSD (POST-TRAUMATIC STRESS DISORDER): ICD-10-CM

## 2025-07-03 PROCEDURE — 90837 PSYTX W PT 60 MINUTES: CPT | Performed by: COUNSELOR
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
## 2025-07-08 LAB
25(OH)D3+25(OH)D2 SERPL-MCNC: 34 NG/ML (ref 30–100)
ALBUMIN SERPL-MCNC: 4.4 G/DL (ref 3.6–5.1)
ALP SERPL-CCNC: 73 U/L (ref 37–153)
ALT SERPL-CCNC: 40 U/L (ref 6–29)
ANION GAP SERPL CALCULATED.4IONS-SCNC: 7 MMOL/L (CALC) (ref 7–17)
AST SERPL-CCNC: 28 U/L (ref 10–35)
BASOPHILS # BLD AUTO: 43 CELLS/UL (ref 0–200)
BASOPHILS NFR BLD AUTO: 0.7 %
BILIRUB SERPL-MCNC: 0.3 MG/DL (ref 0.2–1.2)
BUN SERPL-MCNC: 14 MG/DL (ref 7–25)
CALCIUM SERPL-MCNC: 9.5 MG/DL (ref 8.6–10.4)
CHLORIDE SERPL-SCNC: 106 MMOL/L (ref 98–110)
CHOLEST SERPL-MCNC: 154 MG/DL
CHOLEST/HDLC SERPL: 2.2 (CALC)
CO2 SERPL-SCNC: 28 MMOL/L (ref 20–32)
CREAT SERPL-MCNC: 0.44 MG/DL (ref 0.5–1.03)
EGFRCR SERPLBLD CKD-EPI 2021: 113 ML/MIN/1.73M2
EOSINOPHIL # BLD AUTO: 159 CELLS/UL (ref 15–500)
EOSINOPHIL NFR BLD AUTO: 2.6 %
ERYTHROCYTE [DISTWIDTH] IN BLOOD BY AUTOMATED COUNT: 14 % (ref 11–15)
GLUCOSE SERPL-MCNC: 114 MG/DL (ref 65–139)
HCT VFR BLD AUTO: 38.5 % (ref 35–45)
HDLC SERPL-MCNC: 69 MG/DL
HGB BLD-MCNC: 12.2 G/DL (ref 11.7–15.5)
LDLC SERPL CALC-MCNC: 70 MG/DL (CALC)
LYMPHOCYTES # BLD AUTO: 2464 CELLS/UL (ref 850–3900)
LYMPHOCYTES NFR BLD AUTO: 40.4 %
MCH RBC QN AUTO: 29.5 PG (ref 27–33)
MCHC RBC AUTO-ENTMCNC: 31.7 G/DL (ref 32–36)
MCV RBC AUTO: 93 FL (ref 80–100)
MONOCYTES # BLD AUTO: 464 CELLS/UL (ref 200–950)
MONOCYTES NFR BLD AUTO: 7.6 %
NEUTROPHILS # BLD AUTO: 2971 CELLS/UL (ref 1500–7800)
NEUTROPHILS NFR BLD AUTO: 48.7 %
NONHDLC SERPL-MCNC: 85 MG/DL (CALC)
PLATELET # BLD AUTO: 375 THOUSAND/UL (ref 140–400)
PMV BLD REES-ECKER: 9.4 FL (ref 7.5–12.5)
POTASSIUM SERPL-SCNC: 3.8 MMOL/L (ref 3.5–5.3)
PROT SERPL-MCNC: 6.2 G/DL (ref 6.1–8.1)
RBC # BLD AUTO: 4.14 MILLION/UL (ref 3.8–5.1)
SODIUM SERPL-SCNC: 141 MMOL/L (ref 135–146)
TRIGL SERPL-MCNC: 69 MG/DL
TSH SERPL-ACNC: 1.97 MIU/L (ref 0.4–4.5)
WBC # BLD AUTO: 6.1 THOUSAND/UL (ref 3.8–10.8)

## 2025-07-09 LAB
ALBUMIN/CREAT UR: 6 MG/G CREAT
CREAT UR-MCNC: 47 MG/DL (ref 20–275)
MICROALBUMIN UR-MCNC: 0.3 MG/DL

## 2025-07-10 ENCOUNTER — APPOINTMENT (OUTPATIENT)
Dept: RADIOLOGY | Facility: CLINIC | Age: 55
End: 2025-07-10
Payer: MEDICAID

## 2025-07-17 ENCOUNTER — APPOINTMENT (OUTPATIENT)
Dept: BEHAVIORAL HEALTH | Facility: CLINIC | Age: 55
End: 2025-07-17
Payer: MEDICAID

## 2025-07-17 DIAGNOSIS — F43.10 PTSD (POST-TRAUMATIC STRESS DISORDER): ICD-10-CM

## 2025-07-17 PROCEDURE — 90837 PSYTX W PT 60 MINUTES: CPT | Performed by: COUNSELOR

## 2025-07-17 NOTE — PROGRESS NOTES
All Individuals Present: Patient and Provider (Encounter Provider)     ID: Yarelis Lew is a 55 y.o. female      Pt met with [facility] for follow-up visit for symptoms of PTSD.    An interactive audio and video telecommunication system which permits real time communications between the patient (at the originating site) and the provider (at the distant site) was utilized to provide this telehealth service. Verbal consent was requested and obtained from Yarelis Lew on this date 07/17/25 for a telehealth visit.    Mental Status Exam  Oriented: Person, Place, and Time  Appearance: well groomed, appropriate eye contact  Affect/Mood: anxious and congruent with mood and topic of conversation  Memory:  intact  Speech:  Normal rate, volume, prosody  Thought:  Abstract reasoning appropriate to age  Judgment: Appropriate to age  Insight: intact  Attitude/Behavior: cooperative    Risk Assessment:  Imminent Risk of Suicide or Serious Self-Injury: None, denied  Imminent Risk of Violence or Homicide: None, denied    Progress Note:  Pt shared that she is adjusting to her new living situation. She is feeling positive about it, but she is also considering what her next step will be as she re-establishes her life. [facility] validated Pt and encouraged her to take time for herself.    Treatment Goal: Reduce anxiety to improve medical outcomes.     Treatment modality/frequency: weekly individual therapy    Start Time: 1:03 pm  End Time: 2:02 pm  CPT Code: 77739    Attestation Statements   Number of minutes spent performing psychotherapy: 59

## 2025-07-24 ENCOUNTER — APPOINTMENT (OUTPATIENT)
Dept: RADIOLOGY | Facility: CLINIC | Age: 55
End: 2025-07-24
Payer: MEDICAID

## 2025-07-24 VITALS — WEIGHT: 121 LBS | BODY MASS INDEX: 18.99 KG/M2 | HEIGHT: 67 IN

## 2025-07-24 DIAGNOSIS — Z12.31 VISIT FOR SCREENING MAMMOGRAM: ICD-10-CM

## 2025-07-24 PROCEDURE — 77063 BREAST TOMOSYNTHESIS BI: CPT | Performed by: RADIOLOGY

## 2025-07-24 PROCEDURE — 77063 BREAST TOMOSYNTHESIS BI: CPT

## 2025-07-24 PROCEDURE — 77067 SCR MAMMO BI INCL CAD: CPT | Performed by: RADIOLOGY

## 2025-07-31 ENCOUNTER — APPOINTMENT (OUTPATIENT)
Dept: BEHAVIORAL HEALTH | Facility: CLINIC | Age: 55
End: 2025-07-31
Payer: MEDICAID

## 2025-07-31 DIAGNOSIS — F43.10 PTSD (POST-TRAUMATIC STRESS DISORDER): ICD-10-CM

## 2025-07-31 PROCEDURE — 90837 PSYTX W PT 60 MINUTES: CPT | Performed by: COUNSELOR

## 2025-07-31 NOTE — PROGRESS NOTES
All Individuals Present: Patient and Provider (Encounter Provider)     ID: Yarelis Lew is a 55 y.o. female      Pt met with [facility] for follow-up visit for symptoms of PTSD.    An interactive audio and video telecommunication system which permits real time communications between the patient (at the originating site) and the provider (at the distant site) was utilized to provide this telehealth service. Verbal consent was requested and obtained from Yarelis Lew on this date 07/31/25 for a telehealth visit.    Pt is meeting with therapist as a part of Personalized Care.    Mental Status Exam  Oriented: Person, Place, and Time  Appearance: well groomed, appropriate eye contact  Affect/Mood: congruent with mood and topic of conversation  Memory:  intact  Speech:  Normal rate, volume, prosody  Thought:  Abstract reasoning appropriate to age  Judgment: Appropriate to age  Insight: intact  Attitude/Behavior: cooperative    Risk Assessment:  Imminent Risk of Suicide or Serious Self-Injury: None, denied  Imminent Risk of Violence or Homicide: None, denied    Progress Note:  Pt shared that she is working on finding a long-term living situation in the next few months to find a new place to live. She reported that she appreciates the time to figure things out, but she is ready to find a place for herself. Pt stated that she is unsure what her next steps will be but she is hopeful for what the future holds. [facility] validated Pt and encouraged her keep looking forward towards the next steps.     Treatment Goal: Reduce anxiety to improve medical outcomes.     Treatment modality/frequency: weekly individual therapy    Start Time: 1:03 pm  End Time: 2:00  pm  CPT Code: 55808    Attestation Statements   Number of minutes spent performing psychotherapy: 57

## 2025-08-04 ENCOUNTER — APPOINTMENT (OUTPATIENT)
Dept: BEHAVIORAL HEALTH | Facility: CLINIC | Age: 55
End: 2025-08-04
Payer: MEDICAID

## 2025-08-04 DIAGNOSIS — S22.080G CLOSED WEDGE COMPRESSION FRACTURE OF T12 VERTEBRA WITH DELAYED HEALING, SUBSEQUENT ENCOUNTER: ICD-10-CM

## 2025-08-04 DIAGNOSIS — Z91.411: ICD-10-CM

## 2025-08-04 DIAGNOSIS — F41.8 MIXED ANXIETY AND DEPRESSIVE DISORDER: ICD-10-CM

## 2025-08-04 PROCEDURE — 99205 OFFICE O/P NEW HI 60 MIN: CPT | Performed by: PSYCHIATRY & NEUROLOGY

## 2025-08-04 RX ORDER — DULOXETIN HYDROCHLORIDE 60 MG/1
60 CAPSULE, DELAYED RELEASE ORAL DAILY
Qty: 30 CAPSULE | Refills: 1 | Status: SHIPPED | OUTPATIENT
Start: 2025-08-04 | End: 2025-10-03

## 2025-08-04 SDOH — ECONOMIC STABILITY: INCOME INSECURITY: IN THE LAST 12 MONTHS, WAS THERE A TIME WHEN YOU WERE NOT ABLE TO PAY THE MORTGAGE OR RENT ON TIME?: NO

## 2025-08-04 SDOH — ECONOMIC STABILITY: TRANSPORTATION INSECURITY
IN THE PAST 12 MONTHS, HAS THE LACK OF TRANSPORTATION KEPT YOU FROM MEDICAL APPOINTMENTS OR FROM GETTING MEDICATIONS?: NO

## 2025-08-04 SDOH — ECONOMIC STABILITY: TRANSPORTATION INSECURITY
IN THE PAST 12 MONTHS, HAS LACK OF TRANSPORTATION KEPT YOU FROM MEETINGS, WORK, OR FROM GETTING THINGS NEEDED FOR DAILY LIVING?: NO

## 2025-08-04 ASSESSMENT — SOCIAL DETERMINANTS OF HEALTH (SDOH)
WITHIN THE LAST YEAR, HAVE YOU BEEN KICKED, HIT, SLAPPED, OR OTHERWISE PHYSICALLY HURT BY YOUR PARTNER OR EX-PARTNER?: NO
IN A TYPICAL WEEK, HOW MANY TIMES DO YOU TALK ON THE PHONE WITH FAMILY, FRIENDS, OR NEIGHBORS?: MORE THAN THREE TIMES A WEEK
HOW OFTEN DO YOU ATTEND CHURCH OR RELIGIOUS SERVICES?: 1 TO 4 TIMES PER YEAR
HOW OFTEN DO YOU GET TOGETHER WITH FRIENDS OR RELATIVES?: MORE THAN THREE TIMES A WEEK
WITHIN THE LAST YEAR, HAVE TO BEEN RAPED OR FORCED TO HAVE ANY KIND OF SEXUAL ACTIVITY BY YOUR PARTNER OR EX-PARTNER?: NO
WITHIN THE LAST YEAR, HAVE YOU BEEN AFRAID OF YOUR PARTNER OR EX-PARTNER?: NO
HOW HARD IS IT FOR YOU TO PAY FOR THE VERY BASICS LIKE FOOD, HOUSING, MEDICAL CARE, AND HEATING?: NOT VERY HARD
HOW OFTEN DO YOU ATTENT MEETINGS OF THE CLUB OR ORGANIZATION YOU BELONG TO?: 1 TO 4 TIMES PER YEAR
WITHIN THE LAST YEAR, HAVE YOU BEEN HUMILIATED OR EMOTIONALLY ABUSED IN OTHER WAYS BY YOUR PARTNER OR EX-PARTNER?: NO
IN THE PAST 12 MONTHS, HAS THE ELECTRIC, GAS, OIL, OR WATER COMPANY THREATENED TO SHUT OFF SERVICE IN YOUR HOME?: NO
DO YOU BELONG TO ANY CLUBS OR ORGANIZATIONS SUCH AS CHURCH GROUPS UNIONS, FRATERNAL OR ATHLETIC GROUPS, OR SCHOOL GROUPS?: YES

## 2025-08-04 ASSESSMENT — ENCOUNTER SYMPTOMS
DEPRESSED MOOD: 1
DYSPHORIC MOOD: 1
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
## 2025-08-04 NOTE — ASSESSMENT & PLAN NOTE
The FDA risks, benefits & alternatives to the medications prescribed were explained to you today. You were able to understand & repeat these risks, benefits & alternatives to these prescribed medications. You have agreed to proceed with treatment with the medications discussed based on the conclusion that the benefit outweighs the risks of this treatment regimen: increase duloxetine to 60 mg daily from your current 30 mg daily.   Continue with your therapist weekly.   Follow up in 6 weeks through the [facility].

## 2025-08-04 NOTE — ASSESSMENT & PLAN NOTE
The FDA risks, benefits & alternatives to the medications prescribed were explained to you today. You were able to understand & repeat these risks, benefits & alternatives to these prescribed medications. You have agreed to proceed with treatment with the medications discussed based on the conclusion that the benefit outweighs the risks of this treatment regimen: increase duloxetine to 60 mg daily from your current 30 mg daily.   Continue with your therapist weekly.   Follow up in 6 weeks through the [facility].     Time:   Prep time on date of the patient encounter: 5 minutes.   Time spent directly with patient/family/caregiver: 60 minutes.   Additional time spent on patient care activities:  minutes.   Documentation time: 5 minutes.   Total time on date of patient encounter: 70 minutes.

## 2025-08-13 ENCOUNTER — APPOINTMENT (OUTPATIENT)
Dept: BEHAVIORAL HEALTH | Facility: CLINIC | Age: 55
End: 2025-08-13
Payer: MEDICAID

## 2025-08-13 DIAGNOSIS — F43.10 PTSD (POST-TRAUMATIC STRESS DISORDER): ICD-10-CM

## 2025-08-13 PROCEDURE — 90832 PSYTX W PT 30 MINUTES: CPT | Performed by: COUNSELOR

## 2025-08-15 DIAGNOSIS — M43.25 FUSION OF SPINE, THORACOLUMBAR REGION: ICD-10-CM

## 2025-08-15 PROCEDURE — RXMED WILLOW AMBULATORY MEDICATION CHARGE

## 2025-08-15 RX ORDER — DICLOXACILLIN SODIUM 500 MG/1
500 CAPSULE ORAL DAILY
Qty: 90 CAPSULE | Refills: 3 | Status: CANCELLED | OUTPATIENT
Start: 2025-08-15 | End: 2026-08-15

## 2025-08-18 ENCOUNTER — PHARMACY VISIT (OUTPATIENT)
Dept: PHARMACY | Facility: CLINIC | Age: 55
End: 2025-08-18
Payer: MEDICAID

## 2025-08-19 DIAGNOSIS — M46.20: ICD-10-CM

## 2025-08-19 PROCEDURE — RXMED WILLOW AMBULATORY MEDICATION CHARGE

## 2025-08-19 RX ORDER — DICLOXACILLIN SODIUM 500 MG/1
500 CAPSULE ORAL EVERY 24 HOURS
Qty: 180 CAPSULE | Refills: 0 | Status: SHIPPED | OUTPATIENT
Start: 2025-08-19 | End: 2026-02-15

## 2025-08-21 ENCOUNTER — APPOINTMENT (OUTPATIENT)
Dept: BEHAVIORAL HEALTH | Facility: CLINIC | Age: 55
End: 2025-08-21
Payer: MEDICAID

## 2025-08-21 ENCOUNTER — PHARMACY VISIT (OUTPATIENT)
Dept: PHARMACY | Facility: CLINIC | Age: 55
End: 2025-08-21
Payer: MEDICAID

## 2025-08-21 DIAGNOSIS — F43.10 PTSD (POST-TRAUMATIC STRESS DISORDER): ICD-10-CM

## 2025-08-21 PROCEDURE — 90837 PSYTX W PT 60 MINUTES: CPT | Performed by: COUNSELOR

## 2025-09-04 ENCOUNTER — APPOINTMENT (OUTPATIENT)
Dept: BEHAVIORAL HEALTH | Facility: CLINIC | Age: 55
End: 2025-09-04
Payer: MEDICAID

## 2025-09-19 ENCOUNTER — APPOINTMENT (OUTPATIENT)
Dept: BEHAVIORAL HEALTH | Facility: CLINIC | Age: 55
End: 2025-09-19
Payer: MEDICAID

## 2025-10-08 ENCOUNTER — APPOINTMENT (OUTPATIENT)
Dept: PRIMARY CARE | Facility: CLINIC | Age: 55
End: 2025-10-08
Payer: MEDICAID